# Patient Record
Sex: FEMALE | Race: WHITE | NOT HISPANIC OR LATINO | Employment: FULL TIME | URBAN - METROPOLITAN AREA
[De-identification: names, ages, dates, MRNs, and addresses within clinical notes are randomized per-mention and may not be internally consistent; named-entity substitution may affect disease eponyms.]

---

## 2017-01-02 ENCOUNTER — APPOINTMENT (OUTPATIENT)
Dept: PHYSICAL THERAPY | Facility: HOSPITAL | Age: 37
End: 2017-01-02

## 2017-01-03 ENCOUNTER — HOSPITAL ENCOUNTER (OUTPATIENT)
Dept: PHYSICAL THERAPY | Facility: HOSPITAL | Age: 37
Setting detail: THERAPIES SERIES
Discharge: HOME | End: 2017-01-03

## 2017-01-03 ENCOUNTER — TELEPHONE (OUTPATIENT)
Dept: FAMILY MEDICINE CLINIC | Facility: CLINIC | Age: 37
End: 2017-01-03

## 2017-01-03 DIAGNOSIS — R10.9 RIGHT FLANK PAIN: Primary | ICD-10-CM

## 2017-01-03 PROCEDURE — 97110 THERAPEUTIC EXERCISES: CPT | Performed by: PHYSICAL THERAPIST

## 2017-01-03 NOTE — TELEPHONE ENCOUNTER
Patient states that she has another UTI and would like something called in. Patient is allergic to Arithmin and penicillins. Last ov was 12/0. Pharmacy-[facility]

## 2017-01-03 NOTE — PROGRESS NOTES
"    Outpatient Physical Therapy Ortho Treatment Note  [facility]     Patient Name: Lita Varma  : 1980  MRN: [id number]  Today's Date: 1/3/2017      Visit Date: 2017    Visit Dx:    ICD-10-CM ICD-9-CM   1. Right flank pain R10.9 789.09       Patient Active Problem List   Diagnosis   • Dysmenorrhea   • Menorrhagia with regular cycle        Past Medical History   Diagnosis Date   • Hypertension         Past Surgical History   Procedure Laterality Date   • Appendectomy     • Tonsillectomy     • Gastric sleeve laparoscopic     • Cholecystectomy N/A 2016     Procedure: CHOLECYSTECTOMY LAPAROSCOPIC;  Surgeon: Panda Hodge MD;  Location: [facility];  Service:                              PT Assessment/Plan       17 1500 16 0900       PT Assessment    Assessment Comments Patient experienced mild symptom exacerbation prior to treatment as well as during.  She does experience a change with qped thoracic rotations, which could indicate musculoskeletal origin.  If patient continues worsen or no improvements are made then patient is likely appropriate to follow back up with her MD.  -MONI Patient tolerates exercise well this date.  She experiences no increase in her pain.  -MONI     PT Plan    PT Plan Comments try self rib mobilizations  -MONI continue progression of strengthening exercises to tolerance.  -MONI       User Key  (r) = Recorded By, (t) = Taken By, (c) = Cosigned By    Initials Name Provider Type    MONI Olivares, PT Physical Therapist     Pola Olivares, PT Physical Therapist                    Exercises       17 1500          Subjective Comments    Subjective Comments She states that she ran out of \"nerve blocking\" pills and her pain is up a little more than usual.  SHe has been working on her HEP.  -MONI      Subjective Pain    Able to rate subjective pain? yes  -MONI      Pre-Treatment Pain Level 4  -MONI      Exercise 1    Exercise Name 1 retro scit fit   L 3.5  -MONI   "    Time (Minutes) 1 5  -MONI      Exercise 2    Exercise Name 2 carrie stretch  -MONI      Sets 2 2  -MONI      Time (Seconds) 2 45  -MONI      Exercise 3    Exercise Name 3 thoracic wall slides  -MONI      Sets 3 2  -MONI      Reps 3 10  -MONI      Exercise 4    Exercise Name 4 qped thoracic rotations  -MONI      Sets 4 2  -MONI      Reps 4 10  -MONI      Exercise 5    Exercise Name 5 deep hang from door frame  -MONI      Sets 5 2  -MONI      Time (Seconds) 5 30  -MONI        User Key  (r) = Recorded By, (t) = Taken By, (c) = Cosigned By    Initials Name Provider Type    MONI Olivares, PT Physical Therapist                        Manual Rx (last 36 hours)      Manual Treatments       01/03/17 1500          Manual Rx 1    Manual Rx 1 Location thoracic spine  -MONI      Manual Rx 1 Type P-A's   -MONI      Manual Rx 1 Grade III  -MONI      Manual Rx 1 Duration 5  -MONI        User Key  (r) = Recorded By, (t) = Taken By, (c) = Cosigned By    Initials Name Provider Type    MONI Olivares, PT Physical Therapist                    Therapy Education       01/03/17 1556    Therapy Education    Given HEP   added qped thoracic rotations and thoracic wall slides  -MONI    Program New  -MONI    How Provided Verbal;Demonstration;Written  -MONI    Provided to Patient  -MONI    Level of Understanding Verbalized;Demonstrated  -MONI      User Key  (r) = Recorded By, (t) = Taken By, (c) = Cosigned By    Initials Name Provider Type    MONI Olivares, PT Physical Therapist                Time Calculation:   Start Time: 1503  Total Timed Code Minutes- PT: 45 minute(s)    Therapy Charges for Today     Code Description Service Date Service Provider Modifiers Qty    [id number]  PT THER PROC EA 15 MIN 1/3/2017 Pola Olivares, PT GP 3                    Pola Olivares, PT  1/3/2017

## 2017-01-03 NOTE — TELEPHONE ENCOUNTER
Please: Macrobid 1 twice a day ×7 days #14 with no refills and have patient follow-up with me next week in office for recheck if no better, sooner if symptoms worsen, follow up on Friday if unimproved by then.

## 2017-01-12 ENCOUNTER — APPOINTMENT (OUTPATIENT)
Dept: PHYSICAL THERAPY | Facility: HOSPITAL | Age: 37
End: 2017-01-12

## 2017-01-23 ENCOUNTER — OFFICE VISIT (OUTPATIENT)
Dept: FAMILY MEDICINE CLINIC | Facility: CLINIC | Age: 37
End: 2017-01-23

## 2017-01-23 VITALS
SYSTOLIC BLOOD PRESSURE: 120 MMHG | RESPIRATION RATE: 16 BRPM | WEIGHT: 230.6 LBS | HEIGHT: 71 IN | HEART RATE: 68 BPM | DIASTOLIC BLOOD PRESSURE: 86 MMHG | TEMPERATURE: 97.7 F | BODY MASS INDEX: 32.28 KG/M2

## 2017-01-23 DIAGNOSIS — R10.9 RIGHT FLANK PAIN, CHRONIC: Primary | ICD-10-CM

## 2017-01-23 DIAGNOSIS — G89.29 RIGHT FLANK PAIN, CHRONIC: Primary | ICD-10-CM

## 2017-01-23 PROCEDURE — 99212 OFFICE O/P EST SF 10 MIN: CPT | Performed by: PHYSICIAN ASSISTANT

## 2017-01-23 NOTE — MR AVS SNAPSHOT
Lita Varma   2017 10:15 AM   Office Visit    Dept Phone:  [phone]   Encounter #:  [id number]    Provider:  Lana Sotelo PA-C   Department:  [facility]                Your Full Care Plan              Your Updated Medication List          This list is accurate as of: 17 10:58 AM.  Always use your most recent med list.                cholecalciferol 1000 UNITS tablet   Commonly known as:  VITAMIN D3       FERROUS SULFATE PO       gabapentin 300 MG capsule   Commonly known as:  NEURONTIN   Take one nightly       ibuprofen 200 MG tablet   Commonly known as:  ADVIL,MOTRIN       MethylPREDNISolone 4 MG tablet   Commonly known as:  MEDROL (KIT)   Take as directed on package instructions.       MULTIVITAMIN ADULT PO       nitrofurantoin (macrocrystal-monohydrate) 100 MG capsule   Commonly known as:  MACROBID   Take 1 capsule by mouth 2 (Two) Times a Day.       norethindrone-ethinyl estradiol 1-20 MG-MCG per tablet   Commonly known as:  JUNEL FE    Take 1 tablet by mouth Daily. Skipping placebo pills               Instructions     None    Patient Instructions History      Upcoming Appointments     Visit Type Date Time Department    FOLLOW UP 2017 10:15 AM [facility]    GYN FOLLOW UP 2017  2:50 PM [facility]    FOLLOW UP 10/24/2017  4:30 PM [facility]      MyChart Signup     [facility] [employer] allows you to send messages to your doctor, view your test results, renew your prescriptions, schedule appointments, and more. To sign up, go to [employer] and click on the Sign Up Now link in the New User? box. Enter your [employer] Activation Code exactly as it appears below along with the last four digits of your Social Security Number and your Date of Birth () to complete the sign-up process. If you do not sign up before the expiration date, you must request a new code.    [employer]  "Activation Code: [id number]-  Expires: 1/26/2017  5:36 AM    If you have questions, you can email [email or web address] or call [phone] to talk to our MyChart staff. Remember, [email or web address] is NOT to be used for urgent needs. For medical emergencies, dial 911.               Other Info from Your Visit           Your Appointments     Jan 26, 2017  2:50 PM EST   GYN FOLLOW UP with Staci Faustin DO   [facility] [place] (--)    [address] [place] [address]  [facility] [id number] [phone]            Oct 24, 2017  4:30 PM EDT   Follow Up with DANIELLE Pascual   [facility] (--)    [address]  [facility] [address] [phone]           Arrive 15 minutes prior to appointment.              Allergies     Arithmin [Antazoline]  Rash    Penicillins  Rash      Reason for Visit     Pain pain in side, physical therapist recommended drDennise visit      Vital Signs     Blood Pressure Pulse Temperature Respirations Height Weight    120/86 (BP Location: Left arm) 68 97.7 °F (36.5 °C) (Oral) 16 71\" (180.3 cm) 230 lb 9.6 oz (105 kg)    Body Mass Index Smoking Status                32.16 kg/m2 Never Smoker            "

## 2017-01-23 NOTE — PROGRESS NOTES
Subjective   Lita Varma is a 36 y.o. female    History of Present Illness  Patient resents a for follow-up on chronic right flank pain.  This is been bothering patient on and off for over one year.  Please see previous office notes.  She states that she has recently been on physical therapy and she doesn't really think it has helped a lot.  However, it is much less painful than it was prior to her cholecystectomy at this point in time.  She states it isn't daily seems to come on at random times.  It will come on if she is been standing on her feet for a long day at work but then it worsens when she sits down.  Interestingly it always goes away 100% if she lays down.  She states it go away within a few minutes of lying down.  Never bothers her at nighttime.  It does not cause any difficulty breathing.  No  complaints at this time.  She states she can exercise without pain but it does seem to sometimes come on earlier in the day on days that she exercises.  She had an ultrasound in May 2016 showing a normal liver and normal right kidney.  She states it is tolerable at this point in time.  The following portions of the patient's history were reviewed and updated as appropriate: allergies, current medications, past social history and problem list    Review of Systems   Constitutional: Negative.    Respiratory: Negative.    Gastrointestinal: Positive for abdominal pain (right flank at right upper quadrant near rib cage when it does occur).   Musculoskeletal: Positive for back pain (soreness occurs at the right flank from the back to the front, no pain today). Negative for arthralgias, gait problem and myalgias.   Neurological: Negative for dizziness, tremors, weakness and numbness.   Psychiatric/Behavioral: Negative for behavioral problems and dysphoric mood. The patient is not nervous/anxious.        Objective     Vitals:    01/23/17 1032   BP: 120/86   Pulse: 68   Resp: 16   Temp: 97.7 °F (36.5 °C)        Physical Exam   Constitutional: She is oriented to person, place, and time. She appears well-developed and well-nourished.   Eyes: Conjunctivae are normal.   Cardiovascular: Normal rate and regular rhythm.    Pulmonary/Chest: Effort normal and breath sounds normal.   Abdominal: Soft. Bowel sounds are normal. She exhibits no distension and no mass. There is no tenderness. There is no rebound and no guarding. No hernia.   Neurological: She is alert and oriented to person, place, and time.   Skin: Skin is warm and dry. No rash noted. No erythema.   Psychiatric: She has a normal mood and affect. Her behavior is normal.   Nursing note and vitals reviewed.      Assessment/Plan     Diagnoses and all orders for this visit:    Right flank pain, chronic   reassurance given, encouraged continued core muscle strengthening, pain most consistent with musculoskeletal in nature.  Follow-up as needed.

## 2017-01-27 ENCOUNTER — DOCUMENTATION (OUTPATIENT)
Dept: PHYSICAL THERAPY | Facility: HOSPITAL | Age: 37
End: 2017-01-27

## 2017-01-27 DIAGNOSIS — R10.9 RIGHT FLANK PAIN: Primary | ICD-10-CM

## 2017-01-27 NOTE — THERAPY DISCHARGE NOTE
Outpatient Physical Therapy Discharge Summary         Patient Name: Lita Varma  : 1980  MRN: [id number]    Today's Date: 2017              PT OP Goals       17 0800          PT Short Term Goals    STG 1 deferred STG  -MONI      Long Term Goals    LTG Date to Achieve 01/10/17  -MONI      LTG 1 Patient to demonstrate independence with HEP.  -MONI      LTG 1 Progress Partially Met  -MONI      LTG 2 Patient to report pain intermittently 0/10  -MONI      LTG 2 Progress Ongoing;Not Met  -MONI      LTG 3 Patient to demonstrate minimal TTP along the abdominal regions  -MONI      LTG 3 Progress Ongoing;Not Met  -MONI      LTG 4 Patient to demonstrate right seated rotation without pain  -MONI      LTG 4 Progress Ongoing;Not Met  -MONI        User Key  (r) = Recorded By, (t) = Taken By, (c) = Cosigned By    Initials Name Provider Type    MONI Olivares, PT Physical Therapist          OP PT Discharge Summary  Reason for Discharge: Maximum functional potential achieved  Outcomes Achieved: Refer to plan of care for updates on goals achieved, Unable to make functional progress toward goals at this time  Discharge Destination: Home with home program, Unknown  Discharge Instructions: Patient was making minimal progress towards her goals.  SHe was to attend reassessment but had to cancel and was unable to reschedule, since she was making minimal progress she was about to be discharged at that time.      Attended 3/5 visits.          Pola Olivares, PT  2017

## 2017-03-01 ENCOUNTER — OFFICE VISIT (OUTPATIENT)
Dept: OBSTETRICS AND GYNECOLOGY | Facility: CLINIC | Age: 37
End: 2017-03-01

## 2017-03-01 VITALS
HEIGHT: 71 IN | BODY MASS INDEX: 31.42 KG/M2 | RESPIRATION RATE: 14 BRPM | OXYGEN SATURATION: 98 % | SYSTOLIC BLOOD PRESSURE: 104 MMHG | DIASTOLIC BLOOD PRESSURE: 70 MMHG | WEIGHT: 224.4 LBS | HEART RATE: 75 BPM

## 2017-03-01 DIAGNOSIS — N94.6 DYSMENORRHEA: ICD-10-CM

## 2017-03-01 DIAGNOSIS — N92.0 MENORRHAGIA WITH REGULAR CYCLE: ICD-10-CM

## 2017-03-01 DIAGNOSIS — Z30.41 ENCOUNTER FOR SURVEILLANCE OF CONTRACEPTIVE PILLS: ICD-10-CM

## 2017-03-01 DIAGNOSIS — Z01.419 WELL WOMAN EXAM WITH ROUTINE GYNECOLOGICAL EXAM: Primary | ICD-10-CM

## 2017-03-01 PROCEDURE — 99395 PREV VISIT EST AGE 18-39: CPT | Performed by: NURSE PRACTITIONER

## 2017-03-01 RX ORDER — LEVONORGESTREL / ETHINYL ESTRADIOL AND ETHINYL ESTRADIOL 150-30(84)
1 KIT ORAL DAILY
Qty: 91 EACH | Refills: 1 | Status: SHIPPED | OUTPATIENT
Start: 2017-03-01 | End: 2017-04-12

## 2017-03-01 NOTE — PROGRESS NOTES
[facility] ANNUAL WELL WOMAN VISIT      Lita Varma  [id number]  1980      Chief Complaint: Gynecologic Exam (problems with OCPs)        History of present illness:    Lita Varma is a 36 y.o. year old  presenting to be seen for her annual exam. She is a previous patient of Dr. Masha Milton, last seen for a problem visit on 2016. At that time she was started on OCPs for dysmenorrhea and menorrhagia. She was instructed to take these continuously, but has changed how she has taken them every month since that time and is not having positive results. She reports UTI the month she took the placebo week and then spotting between periods when skipping placebos. She is requesting an alternate method, but does state she is happy with pill form.  She states she is also in need of an annual exam and pap smear upon arrival today. Of note, she is s/p gastric sleeve 1.5 years ago and has lost 160 lbs in that time. She completed a triathalon recently and is training for a /2 marathon this spring.    SEXUAL Hx:  She is currently sexually active.  In the past year there has not been new sexual partners.    Condoms are typically used.  She would not like to be screened for STD's at today's exam.  Current birth control method: OCP (estrogen/progesterone).  She is not happy with her current method of contraception and does want to discuss alternative methods of contraception.  MENSTRUAL Hx:  Patient's last menstrual period was 2017.  In the past 6 months her cycles have been irregular.   Her menstrual flow is lighter since starting OCPs.   Each month on average there are roughly 0 days of very heavy flow.    Intermenstrual bleeding is present.    Post-coital bleeding is absent.  Dysmenorrhea: is not affecting her activities of daily living  PMS: is not affecting her activities of daily living  Her cycles are a source of concern for her that she wishes to discuss today.  HEALTH Hx:  She  exercises regularly: yes.  She wears her seat belt:yes.  She has concerns about domestic violence: no.  She is a non-smoker.      Past Medical History   Diagnosis Date   • Hypertension        Past Surgical History   Procedure Laterality Date   • Appendectomy     • Tonsillectomy     • Gastric sleeve laparoscopic     • Cholecystectomy N/A 11/11/2016     Procedure: CHOLECYSTECTOMY LAPAROSCOPIC;  Surgeon: Panda Hodge MD;  Location: [facility];  Service:        MEDICATIONS: The current medication list was reviewed and reconciled.     Allergies:  is allergic to arithmin [antazoline] and penicillins.    Family History   Problem Relation Age of Onset   • Cancer Father    • Heart disease Father    • Breast cancer Paternal Aunt    • Heart disease Paternal Grandmother    • Stroke Paternal Grandmother    • Stroke Maternal Grandmother        Health Maintenance:  Last pap smear was 2 years ago, results were  normal PAP..    Review of Systems   Constitutional: Negative for fatigue, fever and unexpected weight change.   HENT: Negative for congestion, ear pain, hearing loss, sinus pressure and trouble swallowing.    Eyes: Negative for visual disturbance.   Respiratory: Negative for cough, chest tightness, shortness of breath and wheezing.    Cardiovascular: Negative for chest pain, palpitations and leg swelling.   Gastrointestinal: Negative for abdominal distention, abdominal pain, constipation, diarrhea, nausea and vomiting.   Endocrine: Negative for cold intolerance, heat intolerance, polydipsia, polyphagia and polyuria.   Genitourinary: Positive for menstrual problem. Negative for difficulty urinating, dyspareunia, dysuria, frequency, hematuria, pelvic pain, urgency, vaginal bleeding, vaginal discharge and vaginal pain.   Musculoskeletal: Negative for arthralgias, gait problem, joint swelling and myalgias.   Skin: Negative for color change, pallor and rash.   Neurological: Negative for dizziness, seizures, syncope,  "weakness, light-headedness, numbness and headaches.   Hematological: Negative for adenopathy. Does not bruise/bleed easily.   Psychiatric/Behavioral: Negative for agitation, confusion, sleep disturbance and suicidal ideas. The patient is not nervous/anxious.        Physical Exam  Vital Signs: Visit Vitals   • /70   • Pulse 75   • Resp 14   • Ht 71\" (180.3 cm)   • Wt 224 lb 6.4 oz (102 kg)   • LMP 02/19/2017   • SpO2 98%   • BMI 31.3 kg/m2      General Appearance:  alert, cooperative, no apparent distress and appears stated age   Neurologic/Psychiatric: A&O x 3, gait steady, appropriate affect   HEENT:  Normocephalic, without obvious abnormality, mucous membranes moist   Neck: Supple, symmetrical, trachea midline, no adenopathy;  No thyromegaly, masses, or tenderness   Back:   Symmetric, no curvature, ROM normal, no CVA tenderness   Lungs:   Clear to auscultation bilaterally; respirations regular, even, and unlabored bilaterally   Heart:  Regular rate and rhythm, no murmurs appreciated   Breasts:  Symmetrical, no masses, no lesions and no nipple discharge   Abdomen:   Soft, non-tender, non-distended, no organomegaly and excess abdominal skin apparent consistent with considerable weight loss   Lymph nodes: No cervical, supraclavicular, inguinal or axillary adenopathy noted   Extremities: Normal, atraumatic; no clubbing, cyanosis, or edema    Skin: No rashes, ulcers, or suspicious lesions noted   Pelvic: External Genitalia  without lesions or skin changes  Vagina  is pink, moist, without lesions.   Cervix  normal, without lesions, no discharge, no CMT and pap obtained  Uterus  normal size, midposition, mobile and nontender  Ovaries  without palpable masses or fullness  Parametria  smooth  Rectovaginal  Female rectovaginal: deferred       Procedure Note:  No notes on file    Assessment and Plan:    Lita was seen today for gynecologic exam.    Diagnoses and all orders for this visit:    Well woman exam with " routine gynecological exam    Menorrhagia with regular cycle    Dysmenorrhea    Encounter for surveillance of contraceptive pills    Other orders  -     Levonorgest-Eth Estrad 91-Day 0.15-0.03 &0.01 MG tablet; Take 1 tablet by mouth Daily.        Lita and I discussed her abnormal menses since starting OCPs. Her dysmenorrhea has improved and she would like to continue some type of hormonal contraceptive. She is understanding that not having taken her pills consistently over the last 3 months may be contributing to her new irregularity and breakthrough bleeding. We discussed a number of alternative options such as injections, implants, IUDs, and other OCPs. At this time she would like to continue OCPs, but decision made to switch her to Seasonique. This way she may have continuous OCPs with a withdrawal bleed every 3 months. She was encouraged to take this daily without missing pills or changing methods for 3-6 months to get a proper idea of how she will tolerate this. She may have BTB until her body adjusts to the new medication. Encouraged backup barrier contraception for the next 4 weeks at least. If this method is not effective, she states she may consider IUD in the future.    Even though it has been less then 3 years since her last Pap smear, a Pap was done today per patient request.  If she does not receive the results of the Pap within 2 weeks  time, she was instructed to call to find out the results.  I explained to Lita that the recommendations for Pap smear interval in a low risk patient's has lengthened to 3 years time.  I encouraged her to be seen yearly for a full physical exam including breast and pelvic exam even during the off years when PAP's will not be performed.    She was recommended to begin mammograms at age 40 for breast cancer screening, colonoscopy at age 50 for colon cancer screening and bone density scans (DEXA) at age 60-65 for osteoporosis screening.       Return in about 1 year  (around 3/1/2018) for annual exam. Call if ongoing menstrual problems.       DILEEP [place]      Note: Speech recognition transcription software was used to dictate portions of this document.  An attempt at proofreading has been made though minor errors in transcription may still be present.  Please do not hesitate to call our office with any questions.

## 2017-03-03 ENCOUNTER — TELEPHONE (OUTPATIENT)
Dept: OBSTETRICS AND GYNECOLOGY | Facility: CLINIC | Age: 37
End: 2017-03-03

## 2017-03-09 ENCOUNTER — TELEPHONE (OUTPATIENT)
Dept: OBSTETRICS AND GYNECOLOGY | Facility: CLINIC | Age: 37
End: 2017-03-09

## 2017-04-12 ENCOUNTER — OFFICE VISIT (OUTPATIENT)
Dept: FAMILY MEDICINE CLINIC | Facility: CLINIC | Age: 37
End: 2017-04-12

## 2017-04-12 VITALS
OXYGEN SATURATION: 99 % | SYSTOLIC BLOOD PRESSURE: 104 MMHG | WEIGHT: 227 LBS | HEIGHT: 71 IN | BODY MASS INDEX: 31.78 KG/M2 | DIASTOLIC BLOOD PRESSURE: 68 MMHG | TEMPERATURE: 97.9 F | HEART RATE: 78 BPM

## 2017-04-12 DIAGNOSIS — J06.9 URI, ACUTE: Primary | ICD-10-CM

## 2017-04-12 PROCEDURE — 99213 OFFICE O/P EST LOW 20 MIN: CPT | Performed by: PHYSICIAN ASSISTANT

## 2017-04-12 PROCEDURE — 96372 THER/PROPH/DIAG INJ SC/IM: CPT | Performed by: PHYSICIAN ASSISTANT

## 2017-04-12 RX ORDER — TRIAMCINOLONE ACETONIDE 40 MG/ML
40 INJECTION, SUSPENSION INTRA-ARTICULAR; INTRAMUSCULAR ONCE
Status: COMPLETED | OUTPATIENT
Start: 2017-04-12 | End: 2017-04-12

## 2017-04-12 RX ORDER — AZITHROMYCIN 250 MG/1
TABLET, FILM COATED ORAL
Qty: 6 TABLET | Refills: 0 | Status: SHIPPED | OUTPATIENT
Start: 2017-04-12 | End: 2017-10-24

## 2017-04-12 RX ADMIN — TRIAMCINOLONE ACETONIDE 40 MG: 40 INJECTION, SUSPENSION INTRA-ARTICULAR; INTRAMUSCULAR at 13:38

## 2017-04-12 NOTE — PROGRESS NOTES
Subjective   Lita Varma is a 36 y.o. female    History of Present Illness    Patient presents today for evaluation of new onset symptoms of head and chest congestion.  Patient states symptoms started on Friday and have worsened.  She is leaving town to go to [place] in 5 days.  She has a lot of fullness in her ears, jaws popping and cracking with history of TMJ and cough.  No fever.  The following portions of the patient's history were reviewed and updated as appropriate: allergies, current medications, past social history and problem list    Review of Systems   Constitutional: Negative for chills, fatigue and fever.   HENT: Positive for congestion, ear pain, postnasal drip, rhinorrhea and sinus pressure. Negative for sore throat.    Eyes: Positive for pain.   Respiratory: Positive for cough. Negative for shortness of breath.    Neurological: Positive for headaches. Negative for dizziness.   Hematological: Negative for adenopathy.       Objective     Vitals:    04/12/17 1315   BP: 104/68   Pulse: 78   Temp: 97.9 °F (36.6 °C)   SpO2: 99%       Physical Exam   Constitutional: She appears well-developed and well-nourished. No distress.   HENT:   Head: Normocephalic and atraumatic.   Right Ear: External ear normal.   Left Ear: External ear normal.   Nose: Nose normal.   Mouth/Throat: Oropharynx is clear and moist. No oropharyngeal exudate.   Eyes: Conjunctivae are normal. Right eye exhibits no discharge. Left eye exhibits no discharge.   Neck: Normal range of motion. Neck supple.   Cardiovascular: Normal rate, regular rhythm and normal heart sounds.    Pulmonary/Chest: Effort normal and breath sounds normal. No respiratory distress.   Lymphadenopathy:     She has no cervical adenopathy.   Skin: Skin is warm and dry. She is not diaphoretic.   Nursing note and vitals reviewed.      Assessment/Plan     Diagnoses and all orders for this visit:    URI, acute  -     azithromycin (ZITHROMAX Z-KIT) 250 MG tablet; Take 2  tablets the first day, then 1 tablet daily for 4 days.  -     Chlorcyclizine-Pseudoephed 25-60 MG tablet; Take 25 mg by mouth 2 (Two) Times a Day. Start with 1/2 tablet every 12 hours for congestion  -     triamcinolone acetonide (KENALOG-40) injection 40 mg; Inject 1 mL into the shoulder, thigh, or buttocks 1 (One) Time.

## 2017-10-24 ENCOUNTER — OFFICE VISIT (OUTPATIENT)
Dept: BARIATRICS/WEIGHT MGMT | Facility: CLINIC | Age: 37
End: 2017-10-24

## 2017-10-24 VITALS
BODY MASS INDEX: 30.94 KG/M2 | SYSTOLIC BLOOD PRESSURE: 113 MMHG | HEART RATE: 85 BPM | OXYGEN SATURATION: 99 % | HEIGHT: 71 IN | WEIGHT: 221 LBS | TEMPERATURE: 98.5 F | RESPIRATION RATE: 18 BRPM | DIASTOLIC BLOOD PRESSURE: 76 MMHG

## 2017-10-24 DIAGNOSIS — R53.83 FATIGUE, UNSPECIFIED TYPE: Primary | ICD-10-CM

## 2017-10-24 DIAGNOSIS — R10.11 RUQ ABDOMINAL PAIN: ICD-10-CM

## 2017-10-24 DIAGNOSIS — Z98.84 S/P BARIATRIC SURGERY: ICD-10-CM

## 2017-10-24 DIAGNOSIS — E61.1 IRON DEFICIENCY: ICD-10-CM

## 2017-10-24 DIAGNOSIS — E55.9 VITAMIN D DEFICIENCY: ICD-10-CM

## 2017-10-24 DIAGNOSIS — R10.13 DYSPEPSIA: ICD-10-CM

## 2017-10-24 PROCEDURE — 99214 OFFICE O/P EST MOD 30 MIN: CPT | Performed by: PHYSICIAN ASSISTANT

## 2017-10-24 RX ORDER — ASCORBIC ACID 500 MG
500 TABLET ORAL DAILY
COMMUNITY

## 2017-10-24 NOTE — PROGRESS NOTES
[facility]  [address] [place] [address]  formerly [facility] [id number] [phone]        Patient Name:  Lita Varma.  :  1980      Date of Visit: 10/25/2017      Reason for Visit:   Annual Eval      HPI: Lita Varma is a 37 y.o. female s/p LSG by GDW on 10/29/15, followed by lap paul on 16 by GDW.    Still unfortunately has chronic episodic RUQ pain.  These complaints of pain precipitated her GB eval in 2016 and ultimately led to lap paul as above.  Patient says unfortunately she feels having her gallbladder removed only made the pain worse.  It comes and goes.  Can never predict when she will have the pain.  Seems to be worse w/ sitting for extended periods of time or travel.  Gets better when her weight remains fairly constant for several days, but will worsen if her weight fluctuates.  Says her PCP told her it must just be pain related to movement of her internal organs that is more noticeable since losing weight.  She has also been told that she should work on strengthening her core abdominal muscles to see if that helps, but she admits, she really has not been committed to that type of exercise at this point.     Feels good otherwise.  Would like to pursue plastic surgery but says she does not have the money for that at this point.  No other issues/concerns.  Continues on a MVI and iron supplement.  Exercises 4 days/week.     Presurgery weight: 389 pounds.  Today's weight is 221 lb (100 kg) pounds, today's  Body mass index is 30.82 kg/(m^2)., and her weight loss since surgery is 168 pounds.      Past Medical History:   Diagnosis Date   • Allergic rhinitis    • Anxiety    • Asthma    • Chronic RUQ pain    • Depression    • Dyspnea on exertion    • Fatigue    • Heart burn    • Hyperlipidemia    • Hypertension    • Iron deficiency anemia     on PO iron   • Joint pain    • Migraine     previously took Amitriptyline   • Obesity    • Restless legs syndrome   "   worse when iron levels low     Past Surgical History:   Procedure Laterality Date   • APPENDECTOMY  1989   • CHOLECYSTECTOMY N/A 11/11/2016    Procedure: CHOLECYSTECTOMY LAPAROSCOPIC;  Surgeon: Panda Hodge MD;  Location: [facility];  Service:    • GASTRIC SLEEVE LAPAROSCOPIC  10/29/2015    GDW   • TONSILLECTOMY  1998     Outpatient Prescriptions Marked as Taking for the 10/24/17 encounter (Office Visit) with DANIELLE Pascual   Medication Sig Dispense Refill   • FERROUS SULFATE PO Take  by mouth.     • Multiple Vitamins-Minerals (MULTIVITAMIN ADULT PO) Take  by mouth.     • vitamin C (ASCORBIC ACID) 500 MG tablet Take 500 mg by mouth Daily.         Allergies   Allergen Reactions   • Arithmin [Antazoline] Rash   • Penicillins Rash       Social History     Social History   • Marital status: Single     Spouse name: N/A   • Number of children: N/A   • Years of education: N/A     Occupational History   • Not on file.     Social History Main Topics   • Smoking status: Never Smoker   • Smokeless tobacco: Never Used   • Alcohol use Yes      Comment: ocassionally   • Drug use: No   • Sexual activity: Not Currently     Other Topics Concern   • Not on file     Social History Narrative       /76 (BP Location: Left arm, Patient Position: Sitting, Cuff Size: Large Adult)  Pulse 85  Temp 98.5 °F (36.9 °C) (Temporal Artery )   Resp 18  Ht 71\" (180.3 cm)  Wt 221 lb (100 kg)  SpO2 99%  BMI 30.82 kg/m2    Physical Exam   Constitutional: She appears well-developed and well-nourished. She is cooperative.   HENT:   Mouth/Throat: Oropharynx is clear and moist and mucous membranes are normal.   Eyes: Conjunctivae are normal. No scleral icterus.   Cardiovascular: Normal rate.    Pulmonary/Chest: Effort normal.   Abdominal: Soft. There is no tenderness.   Musculoskeletal: Normal range of motion. She exhibits no edema.   Neurological: She is alert.   Skin: Skin is warm and dry. No rash noted.   Psychiatric: She has " a normal mood and affect. Judgment normal.         Assessment:  2 years s/p LSG by GDW on 10/29/15, followed by mello miller on 11/11/16 by GDW.    ICD-10-CM ICD-9-CM   1. Fatigue, unspecified type R53.83 780.79   2. Dyspepsia R10.13 536.8   3. RUQ abdominal pain R10.11 789.01   4. Vitamin D deficiency E55.9 268.9   5. Iron deficiency E61.1 280.9   6. S/P bariatric surgery Z98.84 V45.86       Plan:  Continue w/ good food choices and healthy habits.  Continue to focus on high protein, low carb.  Continue routine exercise and advised core strengthening exercises as previously recommended.  Routine bariatric labs ordered.  Continue vitamins w/ adjustments pending lab results.  Call w/ problems/concerns.     The patient was instructed to follow up in 6 months, sooner if needed.      Total time spent w/ patient 25 minutes and 15 minutes spent counseling the patient on nutrition and necessary dietary/lifestyle modifications.

## 2017-11-17 ENCOUNTER — TELEPHONE (OUTPATIENT)
Dept: OBSTETRICS AND GYNECOLOGY | Facility: CLINIC | Age: 37
End: 2017-11-17

## 2017-11-17 NOTE — TELEPHONE ENCOUNTER
Lita has not been taking birth control pills.  When she saw Malena several months ago, they discussed Seasonale, but the patient states that it is too expensive ($100).  She said that at the time she was having severe dysmenorrhea, but that seems to have gotten better.  At this point, she is in a relationship and simply wants contraception.  She is fine with a 28 day pill.  She is expecting to start a period on Monday 11/20/17. I explained to her that Malena is out of the office this afternoon and told her that if she doesn't hear from our office on Monday to call back.

## 2017-11-17 NOTE — TELEPHONE ENCOUNTER
Patient was prescribed birth control however called back last time was way to expensive and now needs birth control can we call her in a generic similar to that might be cheaper if not that's ok as well a

## 2017-11-20 RX ORDER — NORGESTIMATE AND ETHINYL ESTRADIOL 0.25-0.035
1 KIT ORAL DAILY
Qty: 28 TABLET | Refills: 12 | Status: SHIPPED | OUTPATIENT
Start: 2017-11-20 | End: 2018-02-25 | Stop reason: SDUPTHER

## 2017-11-20 NOTE — TELEPHONE ENCOUNTER
I return patient's call to discuss contraception.  She is in any relationship and desiring of contraception.  She did not do well on Loestrin 1/20 in the past but would like to continue with the pill form.  Earlier in the year we discussed Seasonique, but this was too expensive.  After discussion we decided upon Sprintec.  She started her menses 2 days ago.  She was instructed to begin her new pills this coming weekend.  She was encouraged to use backup contraception for at least the first cycle and to take her pills at the same time daily.  Medication risks, benefits, instructions, and potential side effects reviewed.  ACHES reviewed.

## 2017-12-07 ENCOUNTER — TELEPHONE (OUTPATIENT)
Dept: FAMILY MEDICINE CLINIC | Facility: CLINIC | Age: 37
End: 2017-12-07

## 2017-12-07 RX ORDER — FLUCONAZOLE 150 MG/1
150 TABLET ORAL ONCE
Qty: 1 TABLET | Refills: 1 | Status: SHIPPED | OUTPATIENT
Start: 2017-12-07 | End: 2017-12-07

## 2017-12-07 NOTE — TELEPHONE ENCOUNTER
----- Message from Maye Orona sent at 12/6/2017 12:44 PM EST -----  Patient said that she would like to have something called in for a yeast infection to   [facility] [place], [place] - [address] AT [facility] [address] - [phone]  - [phone]   Thanks,  Maye..

## 2018-02-25 ENCOUNTER — TELEPHONE (OUTPATIENT)
Dept: OBSTETRICS AND GYNECOLOGY | Facility: CLINIC | Age: 38
End: 2018-02-25

## 2018-02-25 RX ORDER — NORGESTIMATE AND ETHINYL ESTRADIOL 0.25-0.035
1 KIT ORAL DAILY
Qty: 28 TABLET | Refills: 1 | Status: SHIPPED | OUTPATIENT
Start: 2018-02-25 | End: 2018-07-09 | Stop reason: SDUPTHER

## 2018-02-25 NOTE — TELEPHONE ENCOUNTER
Received call through [facility].    She was anticipating arrival of her birth control through [facility].  It has not arrived and she said to start tomorrow.  She is certain [facility] has the prescription ongoing.  I have sent 1 pack with 1 refill to [facility].    New Medications Ordered This Visit   Medications   • norgestimate-ethinyl estradiol (ORTHO-CYCLEN) 0.25-35 MG-MCG per tablet     Sig: Take 1 tablet by mouth Daily.     Dispense:  28 tablet     Refill:  1

## 2018-07-09 DIAGNOSIS — N92.1 MENORRHAGIA WITH IRREGULAR CYCLE: Primary | ICD-10-CM

## 2018-07-09 RX ORDER — NORGESTIMATE AND ETHINYL ESTRADIOL 0.25-0.035
1 KIT ORAL DAILY
Qty: 28 TABLET | Refills: 1 | Status: SHIPPED | OUTPATIENT
Start: 2018-07-09 | End: 2018-10-01 | Stop reason: SDUPTHER

## 2018-07-09 RX ORDER — NORGESTIMATE AND ETHINYL ESTRADIOL 0.25-0.035
1 KIT ORAL DAILY
Qty: 112 TABLET | OUTPATIENT
Start: 2018-07-09

## 2018-09-13 ENCOUNTER — TELEPHONE (OUTPATIENT)
Dept: OBSTETRICS AND GYNECOLOGY | Facility: CLINIC | Age: 38
End: 2018-09-13

## 2018-09-13 NOTE — TELEPHONE ENCOUNTER
[phone] spoke with patient and scheduled her with Dr. Pinto on Monday 10/1/2018 @ 2:40pm for her annual. Patient states she does not need a refill on her birth control at this time she has enough to last her until her appointment.

## 2018-09-13 NOTE — TELEPHONE ENCOUNTER
----- Message from Marisol Brady LPN sent at 9/13/2018  9:52 AM EDT -----  Regarding: FW: ELISABET - RX REFILL  Contact: [phone]      ----- Message -----  From: Shiloh Martini  Sent: 9/13/2018   9:44 AM  To: [facility]  Subject: ELISABET - RX REFILL                              PATIENT CALLED BECAUSE SHE IS OUT OF HER BIRTH CONTROL.  SHE DOESN'T KNOW IF WE CAN JUST REFILL OR IF SHE HAS TO COME OUT.  SHE IS NOW USING A MAIL ORDER PHARMACY WHICH IS [facility] THEIR FAX IS [phone].

## 2018-09-13 NOTE — TELEPHONE ENCOUNTER
Previous patient of Malena Roman requesting a refill on her birth control. Patient is past due for her annual, last appointment was on 3/1/2017. There is no upcoming appointment scheduled. Patient needs to schedule an appointment prior to additional refills.  [phone] left message for patient to return my call.

## 2018-09-18 DIAGNOSIS — N92.1 MENORRHAGIA WITH IRREGULAR CYCLE: ICD-10-CM

## 2018-09-19 RX ORDER — NORGESTIMATE AND ETHINYL ESTRADIOL 0.25-0.035
1 KIT ORAL DAILY
Qty: 28 TABLET | OUTPATIENT
Start: 2018-09-19

## 2018-09-30 PROBLEM — Z01.419 WELL WOMAN EXAM: Status: ACTIVE | Noted: 2018-09-30

## 2018-10-01 ENCOUNTER — OFFICE VISIT (OUTPATIENT)
Dept: OBSTETRICS AND GYNECOLOGY | Facility: CLINIC | Age: 38
End: 2018-10-01

## 2018-10-01 ENCOUNTER — LAB (OUTPATIENT)
Dept: LAB | Facility: HOSPITAL | Age: 38
End: 2018-10-01

## 2018-10-01 VITALS — DIASTOLIC BLOOD PRESSURE: 64 MMHG | SYSTOLIC BLOOD PRESSURE: 110 MMHG

## 2018-10-01 DIAGNOSIS — Z11.3 ROUTINE SCREENING FOR STI (SEXUALLY TRANSMITTED INFECTION): ICD-10-CM

## 2018-10-01 DIAGNOSIS — Z01.419 WELL WOMAN EXAM: Primary | ICD-10-CM

## 2018-10-01 DIAGNOSIS — N92.1 MENORRHAGIA WITH IRREGULAR CYCLE: ICD-10-CM

## 2018-10-01 LAB
HBV SURFACE AG SERPL QL IA: NORMAL
HCV AB SER DONR QL: NORMAL
HIV1+2 AB SER QL: NORMAL

## 2018-10-01 PROCEDURE — 87340 HEPATITIS B SURFACE AG IA: CPT

## 2018-10-01 PROCEDURE — 86803 HEPATITIS C AB TEST: CPT

## 2018-10-01 PROCEDURE — G0432 EIA HIV-1/HIV-2 SCREEN: HCPCS

## 2018-10-01 PROCEDURE — 86592 SYPHILIS TEST NON-TREP QUAL: CPT | Performed by: OBSTETRICS & GYNECOLOGY

## 2018-10-01 PROCEDURE — 99395 PREV VISIT EST AGE 18-39: CPT | Performed by: OBSTETRICS & GYNECOLOGY

## 2018-10-01 PROCEDURE — 36415 COLL VENOUS BLD VENIPUNCTURE: CPT | Performed by: OBSTETRICS & GYNECOLOGY

## 2018-10-01 RX ORDER — MELATONIN
3000 DAILY
COMMUNITY

## 2018-10-01 RX ORDER — NORGESTIMATE AND ETHINYL ESTRADIOL 0.25-0.035
1 KIT ORAL DAILY
Qty: 28 TABLET | Refills: 12 | Status: SHIPPED | OUTPATIENT
Start: 2018-10-01 | End: 2018-10-22

## 2018-10-01 NOTE — PROGRESS NOTES
Chief Complaint   Patient presents with   • Gynecologic Exam     Transfer GYN from edmundo Guy c/o's       Lita Varma is a 37 y.o. year old  presenting to be seen for her annual exam.     SEXUAL Hx:  She is currently sexually active.  In the past year there there has been MORE THAN ONE new sexual partner.    Condoms mostly.  She would like to be screened for STD's at today's exam.  Current birth control method: OCP (estrogen/progesterone).  She is sort happy with her current method of contraception and does want to discuss alternative methods of contraception. Concerned about weight gain.  Has been on birth control pill for about year - does not miss pills.   MENSTRUAL Hx:  Patient's last menstrual period was 2018 (approximate).  In the past 6 months her cycles have been regular, predictable and occur monthly.  Her menstrual flow is typically light.   Each month on average there are roughly 0 day(s) of very heavy flow. Duration 5 days.   Intermenstrual bleeding is absent.    Post-coital bleeding is absent.  Dysmenorrhea: mild and is not affecting her activities of daily living  PMS: mild and is not affecting her activities of daily living  Her cycles are not a source of concern for her that she wishes to discuss today.  HEALTH Hx:  She exercises regularly: yes.  She wears her seat belt: mostly .  She has concerns about domestic violence: no.  OTHER THINGS SHE WANTS TO DISCUSS TODAY:  Nothing else    The following portions of the patient's history were reviewed and updated as appropriate:problem list, current medications, allergies, past family history, past medical history, past social history and past surgical history.    Smoking status: Never Smoker                                                              Smokeless tobacco: Never Used                        Review of Systems  Constitutional POS: nothing reported    NEG: anorexia or night sweats   Genitourinary POS: nothing reported     NEG: dysuria or hematuria      Gastointestinal POS: nothing reported    NEG: bloating, change in bowel habits, melena or reflux symptoms   Integument POS: nothing reported    NEG: moles that are changing in size, shape, color or rashes   Breast POS: nothing reported    NEG: persistent breast lump, skin dimpling or nipple discharge        Objective   /64 (Patient Position: Sitting)   LMP 09/09/2018 (Approximate)     General:  well developed; well nourished  no acute distress   Skin:  No suspicious lesions seen   Thyroid: normal to inspection and palpation   Breasts:  Examined in supine position  Symmetric without masses or skin dimpling  Nipples normal without inversion, lesions or discharge  There are no palpable axillary nodes   Abdomen: soft, non-tender; no masses  no umbilical or inginual hernias are present  no hepato-splenomegaly   Pelvis: Clinical staff was present for exam  External genitalia:  normal appearance of the external genitalia including Bartholin's and McDade's glands.  :  urethral meatus normal;  Vaginal:  normal pink mucosa without prolapse or lesions.  Cervix:  normal appearance.  Uterus:  normal size, shape and consistency.  Adnexa:  normal bimanual exam of the adnexa.        Assessment   1. Normal GYN exam  2. Contraceptive counseling   3. STI screening     Plan   1. Pap and STD testing was done today.  If she does not receive the results of the Pap within 2 weeks  time, she was instructed to call to find out the results.  I explained to Lita that the recommendations for Pap smear interval in a low risk patient's has lengthened to 3 years time.  I encouraged her to be seen yearly for a full physical exam including breast and pelvic exam even during the off years when PAP's will not be performed.  2. Reviewed other options for contraception using a tiered approach and she potentially interested in nexplanon.   3. Prescription(s) for OCP's were refilled today   4. The importance of  keeping all planned follow-up and taking all medications as prescribed was emphasized.  5. Follow up for annual exam in 1 year or sooner if she decides to have nexplanon or LNG IUD    New Medications Ordered This Visit   Medications   • norgestimate-ethinyl estradiol (ORTHO-CYCLEN) 0.25-35 MG-MCG per tablet     Sig: Take 1 tablet by mouth Daily.     Dispense:  28 tablet     Refill:  12          This note was electronically signed.    Kamila Pinto MD  October 1, 2018    Note: Speech recognition transcription software may have been used to create portions of this document.  An attempt at proofreading has been made but errors in transcription could still be present.

## 2018-10-03 ENCOUNTER — TELEPHONE (OUTPATIENT)
Dept: OBSTETRICS AND GYNECOLOGY | Facility: CLINIC | Age: 38
End: 2018-10-03

## 2018-10-03 LAB — RPR SER QL: NORMAL

## 2018-10-03 NOTE — TELEPHONE ENCOUNTER
Called patient to review negative STD cultures and blood work. No answer. Left VM. Pap still pending. Can you try to call once more to let her know? Thanks    Kamila Pinto MD

## 2018-10-04 PROBLEM — B97.7 HPV IN FEMALE: Status: ACTIVE | Noted: 2018-10-04

## 2018-10-05 ENCOUNTER — TELEPHONE (OUTPATIENT)
Dept: OBSTETRICS AND GYNECOLOGY | Facility: CLINIC | Age: 38
End: 2018-10-05

## 2018-10-05 NOTE — TELEPHONE ENCOUNTER
Called patient to review normal STD testing and pap with normal cytology but +[id number]. No answer but left VM with these results and recommendation for colposcopy. Will attempt to call back patient this afternoon and then on Monday.     Kamila Pinot MD

## 2018-10-08 NOTE — TELEPHONE ENCOUNTER
Spoke with pt concerning testing results, she verbalizes understanding. Scheduled for colposcopy and IUD placement.

## 2018-10-22 ENCOUNTER — OFFICE VISIT (OUTPATIENT)
Dept: FAMILY MEDICINE CLINIC | Facility: CLINIC | Age: 38
End: 2018-10-22

## 2018-10-22 VITALS
DIASTOLIC BLOOD PRESSURE: 68 MMHG | HEART RATE: 75 BPM | OXYGEN SATURATION: 98 % | SYSTOLIC BLOOD PRESSURE: 118 MMHG | HEIGHT: 71 IN | TEMPERATURE: 97.9 F

## 2018-10-22 DIAGNOSIS — L30.9 DERMATITIS: ICD-10-CM

## 2018-10-22 DIAGNOSIS — J32.9 SINUSITIS, UNSPECIFIED CHRONICITY, UNSPECIFIED LOCATION: Primary | ICD-10-CM

## 2018-10-22 PROCEDURE — 99213 OFFICE O/P EST LOW 20 MIN: CPT | Performed by: PHYSICIAN ASSISTANT

## 2018-10-22 RX ORDER — AZITHROMYCIN 250 MG/1
TABLET, FILM COATED ORAL
Qty: 6 TABLET | Refills: 0 | Status: SHIPPED | OUTPATIENT
Start: 2018-10-22 | End: 2018-11-06

## 2018-10-22 RX ORDER — TRIAMCINOLONE ACETONIDE 0.25 MG/G
OINTMENT TOPICAL 3 TIMES DAILY
Qty: 15 G | Refills: 0 | Status: SHIPPED | OUTPATIENT
Start: 2018-10-22 | End: 2019-04-11

## 2018-10-22 NOTE — PROGRESS NOTES
Subjective   Lita Varma is a 38 y.o. female  Earache (bilateral ear pain x4 days ); sinus pressure (sinus pressure with headache x4 days ); and Sore Throat (sore throat x1 week )      History of Present Illness  Patient comes in today complaining of a severe cough with headache sinus pressure postnasal drainage sore throat and fatigue for the past week with symptoms worsening daily despite using over-the-counter medications.  She also has had severe problems with dry cracked lips.  The following portions of the patient's history were reviewed and updated as appropriate: allergies, current medications, past social history and problem list    Review of Systems   Constitutional: Negative for chills, fatigue and fever.   HENT: Positive for congestion, ear pain, postnasal drip, rhinorrhea and sinus pressure. Negative for sore throat.    Eyes: Positive for pain.   Respiratory: Positive for cough. Negative for shortness of breath.    Skin: Positive for color change and wound.   Neurological: Positive for headaches. Negative for dizziness.   Hematological: Negative for adenopathy.       Objective     Vitals:    10/22/18 1611   BP: 118/68   Pulse: 75   Temp: 97.9 °F (36.6 °C)   SpO2: 98%       Physical Exam   Constitutional: She appears well-developed and well-nourished.   HENT:   Head: Normocephalic and atraumatic.   Right Ear: Tympanic membrane and ear canal normal.   Left Ear: Tympanic membrane and ear canal normal.   Nose: Mucosal edema, rhinorrhea and sinus tenderness present. Right sinus exhibits maxillary sinus tenderness and frontal sinus tenderness. Left sinus exhibits maxillary sinus tenderness and frontal sinus tenderness.   Mouth/Throat: Oropharynx is clear and moist. No oropharyngeal exudate.   Eyes: Pupils are equal, round, and reactive to light.   Cardiovascular: Normal rate, regular rhythm and normal heart sounds.    Pulmonary/Chest: Effort normal. She has wheezes.   Skin: Skin is warm and dry.   Dry  cracking with scaliness on lower mid lip externally   Nursing note and vitals reviewed.      Assessment/Plan     Diagnoses and all orders for this visit:    Sinusitis, unspecified chronicity, unspecified location    Dermatitis    Other orders  -     triamcinolone (KENALOG) 0.025 % ointment; Apply  topically to the appropriate area as directed 3 (Three) Times a Day. TO FACIAL RASH  -     azithromycin (ZITHROMAX Z-KIT) 250 MG tablet; Take 2 tablets the first day, then 1 tablet daily for 4 days.

## 2018-11-06 ENCOUNTER — OFFICE VISIT (OUTPATIENT)
Dept: OBSTETRICS AND GYNECOLOGY | Facility: CLINIC | Age: 38
End: 2018-11-06

## 2018-11-06 VITALS — DIASTOLIC BLOOD PRESSURE: 82 MMHG | SYSTOLIC BLOOD PRESSURE: 118 MMHG

## 2018-11-06 DIAGNOSIS — Z30.430 ENCOUNTER FOR INSERTION OF MIRENA IUD: Primary | ICD-10-CM

## 2018-11-06 DIAGNOSIS — R35.0 URINARY FREQUENCY: ICD-10-CM

## 2018-11-06 PROBLEM — Z97.5 IUD (INTRAUTERINE DEVICE) IN PLACE: Status: ACTIVE | Noted: 2018-11-06

## 2018-11-06 LAB
BILIRUB UR QL STRIP: NEGATIVE
CLARITY UR: CLEAR
COLOR UR: YELLOW
GLUCOSE UR STRIP-MCNC: NEGATIVE MG/DL
HGB UR QL STRIP.AUTO: NEGATIVE
KETONES UR QL STRIP: NEGATIVE
LEUKOCYTE ESTERASE UR QL STRIP.AUTO: NEGATIVE
NITRITE UR QL STRIP: NEGATIVE
PH UR STRIP.AUTO: 7 [PH] (ref 5–8)
PROT UR QL STRIP: NEGATIVE
SP GR UR STRIP: 1.01 (ref 1–1.03)
UROBILINOGEN UR QL STRIP: NORMAL

## 2018-11-06 PROCEDURE — 81003 URINALYSIS AUTO W/O SCOPE: CPT | Performed by: OBSTETRICS & GYNECOLOGY

## 2018-11-06 PROCEDURE — 58300 INSERT INTRAUTERINE DEVICE: CPT | Performed by: OBSTETRICS & GYNECOLOGY

## 2018-11-06 NOTE — PROGRESS NOTES
IUD Insertion    Patient's last menstrual period was 10/23/2018 (approximate).    Date of procedure:  11/6/2018    Risks and benefits discussed? yes  All questions answered? yes  Consents given by The patient  Written consent obtained? yes    Local anesthesia used:  no    Procedure documentation:    After verifying the patient had a low probability of being pregnant and met the criteria for insertion, a sterile speculum has placed and the cervix was cleansed with an antiseptic solution.  Vaginal discharge was scant.  The anterior lip of the cervix was grasped with a tenaculum and the uterine cavity was gently sounded. There was mild difficulty passing the sound through the cervix.  Cervical dilation did not need to be performed prior to placing the IUD.  The uterus was midposition and sounded to 9 cms.  The Mirena was then prepared per the manufacturers instructions.    The Mirena was advanced to a point 2 cms from the fundus and then the arms were released from the sheath.  The device was advanced to the fundus and the device was released fully from the sheath. The string was cut 3 cms in length.  Bleeding from the cervix was scant.    She tolerated the procedure without any difficulty.     Post procedure instructions: It was reviewed that the Mirena will not alter the timing of when she bleeds but it may decrease the quantity of flow and cramps.  Roughly 30% of people will be amenorrheic over time.  Efficacy rate of 99.2% over 5 years was discussed.  Spontaneous expulsion rate of 1-2% was also discussed.  If she has any issue with fever or excessive bleeding or pain she is to call the office immediately.  Otherwise I would like to see her back in 6 weeks with an ultrasound to confirm correct placement.    This note was electronically signed.    Kamila Pinto MD  November 6, 2018

## 2019-01-28 ENCOUNTER — TELEPHONE (OUTPATIENT)
Dept: FAMILY MEDICINE CLINIC | Facility: CLINIC | Age: 39
End: 2019-01-28

## 2019-01-28 RX ORDER — OSELTAMIVIR PHOSPHATE 75 MG/1
75 CAPSULE ORAL DAILY
Qty: 10 CAPSULE | Refills: 0 | Status: SHIPPED | OUTPATIENT
Start: 2019-01-28 | End: 2019-01-28 | Stop reason: SDUPTHER

## 2019-01-28 RX ORDER — OSELTAMIVIR PHOSPHATE 75 MG/1
75 CAPSULE ORAL DAILY
Qty: 10 CAPSULE | Refills: 0 | Status: SHIPPED | OUTPATIENT
Start: 2019-01-28 | End: 2019-02-19

## 2019-01-28 NOTE — TELEPHONE ENCOUNTER
----- Message from Lian Whitley sent at 1/28/2019 11:39 AM EST -----  Contact: PT  NEPHEW IN HER HOUSEHOLD JUST DXD WITH FLU, WANTED TO SEE IF TAMIFLU COULD BE CALLED IN PREVENTATIVELY FOR HER. OTHER FAMILY MEMBERS IN THE HOUSE HAVE RECEIVED IT. SHE IS WORRIED BECAUSE SHE FLIES OUT TO SKIP IN 5 DAYS.  ...PLEASE NOTE WHICH PHARMACY SHE IS REQUESTING....    [facility] [address] [place] [address] [address] AT [facility] [address] - [phone] PH - [phone] (Phone)  [phone] (Fax)

## 2019-01-28 NOTE — TELEPHONE ENCOUNTER
Looks like that the pharmacy was not set to where she wanted it, I sent a prescription in but it looks like it went to [facility] please call and switch to [facility] for her.

## 2019-02-19 ENCOUNTER — OFFICE VISIT (OUTPATIENT)
Dept: FAMILY MEDICINE CLINIC | Facility: CLINIC | Age: 39
End: 2019-02-19

## 2019-02-19 VITALS
HEIGHT: 71 IN | OXYGEN SATURATION: 99 % | TEMPERATURE: 97.6 F | DIASTOLIC BLOOD PRESSURE: 68 MMHG | SYSTOLIC BLOOD PRESSURE: 110 MMHG | HEART RATE: 82 BPM | BODY MASS INDEX: 30.82 KG/M2

## 2019-02-19 DIAGNOSIS — J32.9 SINUSITIS, UNSPECIFIED CHRONICITY, UNSPECIFIED LOCATION: Primary | ICD-10-CM

## 2019-02-19 PROCEDURE — 99213 OFFICE O/P EST LOW 20 MIN: CPT | Performed by: PHYSICIAN ASSISTANT

## 2019-02-19 RX ORDER — DEXTROMETHORPHAN HYDROBROMIDE AND PROMETHAZINE HYDROCHLORIDE 15; 6.25 MG/5ML; MG/5ML
SYRUP ORAL
Qty: 180 ML | Refills: 0 | Status: SHIPPED | OUTPATIENT
Start: 2019-02-19 | End: 2019-04-11

## 2019-02-19 RX ORDER — CLARITHROMYCIN 500 MG/1
500 TABLET, COATED ORAL 2 TIMES DAILY
Qty: 20 TABLET | Refills: 0 | Status: SHIPPED | OUTPATIENT
Start: 2019-02-19 | End: 2019-04-11

## 2019-02-19 NOTE — PROGRESS NOTES
Subjective   Lita Varma is a 38 y.o. female  Sinus Problem (Sinus pain/pressure with headache x2 weeks); Dizziness (increased dizziness x1 week ); and Sore Throat (sore throat x1 week )      History of Present Illness  Patient comes in today concerned with ongoing persistent sinus pain and pressure for the past 2 weeks dizziness for the past week, sore throat and right ear pain with decreased hearing.  She was treated with antibiotic from a walk-in clinic in January and states that her symptoms never went away.  She does not recall the name of the antibiotic.  The following portions of the patient's history were reviewed and updated as appropriate: allergies, current medications, past social history and problem list    Review of Systems   Constitutional: Negative for chills, fatigue and fever.   HENT: Positive for congestion, ear pain, postnasal drip, rhinorrhea and sinus pressure. Negative for sore throat.    Eyes: Negative for pain.   Respiratory: Positive for cough. Negative for shortness of breath.    Neurological: Positive for headaches. Negative for dizziness.   Hematological: Negative for adenopathy.       Objective     Vitals:    02/19/19 0958   BP: 110/68   Pulse: 82   Temp: 97.6 °F (36.4 °C)   SpO2: 99%       Physical Exam   Constitutional: She is oriented to person, place, and time. She appears well-developed and well-nourished. No distress.   HENT:   Head: Normocephalic and atraumatic.   Right Ear: Tympanic membrane and ear canal normal.   Left Ear: Tympanic membrane and ear canal normal.   Nose: Mucosal edema, rhinorrhea and sinus tenderness present. Right sinus exhibits maxillary sinus tenderness and frontal sinus tenderness. Left sinus exhibits maxillary sinus tenderness and frontal sinus tenderness.   Mouth/Throat: Oropharynx is clear and moist. No oropharyngeal exudate.   Eyes: Pupils are equal, round, and reactive to light.   Cardiovascular: Normal rate and regular rhythm.   Pulmonary/Chest:  Effort normal and breath sounds normal.   Neurological: She is alert and oriented to person, place, and time.   Skin: She is not diaphoretic.   Nursing note and vitals reviewed.      Assessment/Plan     Diagnoses and all orders for this visit:    Sinusitis, unspecified chronicity, unspecified location    Other orders  -     clarithromycin (BIAXIN) 500 MG tablet; Take 1 tablet by mouth 2 (Two) Times a Day.  -     promethazine-dextromethorphan (PROMETHAZINE-DM) 6.25-15 MG/5ML syrup; Take 1-2 tsp every 4 hours as needed for cough

## 2019-03-01 ENCOUNTER — TELEPHONE (OUTPATIENT)
Dept: FAMILY MEDICINE CLINIC | Facility: CLINIC | Age: 39
End: 2019-03-01

## 2019-03-01 RX ORDER — METHYLPREDNISOLONE 4 MG/1
TABLET ORAL
Qty: 21 EACH | Refills: 0 | Status: SHIPPED | OUTPATIENT
Start: 2019-03-01 | End: 2019-04-11

## 2019-03-01 NOTE — TELEPHONE ENCOUNTER
----- Message from Lian Whitley sent at 3/1/2019  8:24 AM EST -----  Contact: PT  EAR STILL HURTING, SAW NATALIE A WEEK AGO. FINISHED ANTIBIOTIC YESTERDAY. NATALIE TOLD HER TO MAKE SURE AND CALL IF IT WAS STILL HURTING AFTER ANTIBIOTICS WERE COMPLETED    PLEASE CALL PT [phone]

## 2019-04-11 ENCOUNTER — HOSPITAL ENCOUNTER (OUTPATIENT)
Dept: GENERAL RADIOLOGY | Facility: HOSPITAL | Age: 39
Discharge: HOME | End: 2019-04-11
Admitting: PHYSICIAN ASSISTANT

## 2019-04-11 ENCOUNTER — LAB (OUTPATIENT)
Dept: LAB | Facility: HOSPITAL | Age: 39
End: 2019-04-11

## 2019-04-11 ENCOUNTER — OFFICE VISIT (OUTPATIENT)
Dept: FAMILY MEDICINE CLINIC | Facility: CLINIC | Age: 39
End: 2019-04-11

## 2019-04-11 VITALS
BODY MASS INDEX: 30.82 KG/M2 | SYSTOLIC BLOOD PRESSURE: 118 MMHG | TEMPERATURE: 98.5 F | HEART RATE: 87 BPM | HEIGHT: 71 IN | OXYGEN SATURATION: 99 % | DIASTOLIC BLOOD PRESSURE: 64 MMHG

## 2019-04-11 DIAGNOSIS — R60.0 LEG EDEMA: ICD-10-CM

## 2019-04-11 DIAGNOSIS — M54.2 NECK PAIN: ICD-10-CM

## 2019-04-11 DIAGNOSIS — M54.2 NECK PAIN: Primary | ICD-10-CM

## 2019-04-11 DIAGNOSIS — G44.201 INTRACTABLE TENSION-TYPE HEADACHE, UNSPECIFIED CHRONICITY PATTERN: ICD-10-CM

## 2019-04-11 DIAGNOSIS — D64.9 ANEMIA, UNSPECIFIED TYPE: ICD-10-CM

## 2019-04-11 PROCEDURE — 83735 ASSAY OF MAGNESIUM: CPT

## 2019-04-11 PROCEDURE — 82607 VITAMIN B-12: CPT

## 2019-04-11 PROCEDURE — 83540 ASSAY OF IRON: CPT

## 2019-04-11 PROCEDURE — 80053 COMPREHEN METABOLIC PANEL: CPT

## 2019-04-11 PROCEDURE — 85027 COMPLETE CBC AUTOMATED: CPT | Performed by: PHYSICIAN ASSISTANT

## 2019-04-11 PROCEDURE — 72040 X-RAY EXAM NECK SPINE 2-3 VW: CPT

## 2019-04-11 PROCEDURE — 99214 OFFICE O/P EST MOD 30 MIN: CPT | Performed by: PHYSICIAN ASSISTANT

## 2019-04-11 PROCEDURE — 82746 ASSAY OF FOLIC ACID SERUM: CPT

## 2019-04-11 PROCEDURE — 36415 COLL VENOUS BLD VENIPUNCTURE: CPT | Performed by: PHYSICIAN ASSISTANT

## 2019-04-11 RX ORDER — METHOCARBAMOL 500 MG/1
TABLET, FILM COATED ORAL
Qty: 60 TABLET | Refills: 3 | Status: SHIPPED | OUTPATIENT
Start: 2019-04-11 | End: 2020-07-31

## 2019-04-11 NOTE — PROGRESS NOTES
Subjective   Lita Varma is a 38 y.o. female  Headache (ongoing headache in the back part of head x1 month ) and Leg Swelling (bilateral leg swelling, increased traveling recently x1 month )      History of Present Illness  Patient comes in for 2 separate problems which are new and ongoing.  She states for the past month she has been having ongoing daily headache in the back of her neck.  She states it hurts at the base of her skull.  She states she feels that somewhat around to the front of her neck.  No pain with swallowing but feels an increased awareness of swallowing.  No sore throat.  No neck trauma.  No numbness or weakness in arms.  She is also noticed increased swelling in both of her lower extremities for the past month after traveling.  She states she started feeling a tightness in both of her legs when she was flying to [place] several weeks ago, her legs felt tight and swollen while she was there and the swelling worsened on the way home.  She states that the swelling has improved somewhat but last weekend when she drove to [place] after couple of hours of driving she noticed a tightness in her legs and some swelling.  She denies shortness of breath or chest pain.  She states she feels a tightness in her legs when she exercises now.  She has never experienced this in the past.  Has no history of DVTs.  Patient is status post gastric sleeve surgery, does take her vitamin supplement pack regularly but is overdue for her labs to be checked regarding her vitamin levels.  The following portions of the patient's history were reviewed and updated as appropriate: allergies, current medications, past social history and problem list    Review of Systems   Constitutional: Negative for fatigue and unexpected weight change.   HENT: Negative for congestion, dental problem, postnasal drip, sinus pressure and sore throat.    Eyes: Negative for photophobia, pain and visual disturbance.   Respiratory: Negative  for choking, chest tightness and shortness of breath.    Cardiovascular: Positive for leg swelling. Negative for chest pain and palpitations.   Gastrointestinal: Negative for nausea and vomiting.   Musculoskeletal: Positive for myalgias, neck pain and neck stiffness.   Neurological: Positive for headaches. Negative for dizziness, syncope, facial asymmetry, speech difficulty, weakness, light-headedness and numbness.   Psychiatric/Behavioral: Negative for agitation, confusion, dysphoric mood and sleep disturbance. The patient is not nervous/anxious.        Objective     Vitals:    04/11/19 1438   BP: 118/64   Pulse: 87   Temp: 98.5 °F (36.9 °C)   SpO2: 99%       Physical Exam   Constitutional: She is oriented to person, place, and time. She appears well-developed and well-nourished. No distress.   HENT:   Head: Normocephalic and atraumatic.   Eyes: Conjunctivae and EOM are normal. Pupils are equal, round, and reactive to light.   Neck: Normal range of motion. Neck supple. No JVD present. No tracheal deviation present. No thyromegaly present.   Cardiovascular: Normal rate, regular rhythm, normal heart sounds and intact distal pulses.   Pulmonary/Chest: Effort normal and breath sounds normal.   Musculoskeletal: Normal range of motion. She exhibits tenderness ( Tenderness bilateral calves, no edema noted). She exhibits no edema.   Lymphadenopathy:     She has no cervical adenopathy.   Neurological: She is alert and oriented to person, place, and time. She displays normal reflexes. No cranial nerve deficit or sensory deficit. She exhibits normal muscle tone. Coordination normal.   Skin: Skin is warm and dry. No rash noted. She is not diaphoretic. No erythema. There is pallor.   Psychiatric: She has a normal mood and affect. Her speech is normal and behavior is normal. Judgment and thought content normal. Cognition and memory are normal.   Nursing note and vitals reviewed.      Assessment/Plan     Diagnoses and all orders  for this visit:    Neck pain  -     XR Spine Cervical 2 or 3 View; Future  -     Magnesium; Future    Leg edema  -     Comprehensive metabolic panel; Future  -     Magnesium; Future    Anemia, unspecified type  -     CBC (No Diff)  -     Iron; Future  -     Vitamin B12; Future  -     Magnesium; Future  -     Folate; Future    Intractable tension-type headache, unspecified chronicity pattern    Schedule patient for bilateral lower extremity venous Doppler.  Advised patient go to ER promptly if symptoms worsen acutely regarding pain pain or swelling in legs.

## 2019-04-12 LAB
ALBUMIN SERPL-MCNC: 4.5 G/DL (ref 3.5–5.2)
ALBUMIN/GLOB SERPL: 1.7 G/DL
ALP SERPL-CCNC: 57 U/L (ref 39–117)
ALT SERPL W P-5'-P-CCNC: 22 U/L (ref 1–33)
ANION GAP SERPL CALCULATED.3IONS-SCNC: 11 MMOL/L
AST SERPL-CCNC: 15 U/L (ref 1–32)
BILIRUB SERPL-MCNC: 0.3 MG/DL (ref 0.2–1.2)
BUN BLD-MCNC: 18 MG/DL (ref 6–20)
BUN/CREAT SERPL: 26.5 (ref 7–25)
CALCIUM SPEC-SCNC: 9 MG/DL (ref 8.6–10.5)
CHLORIDE SERPL-SCNC: 99 MMOL/L (ref 98–107)
CO2 SERPL-SCNC: 27 MMOL/L (ref 22–29)
CREAT BLD-MCNC: 0.68 MG/DL (ref 0.57–1)
DEPRECATED RDW RBC AUTO: 44.1 FL (ref 37–54)
ERYTHROCYTE [DISTWIDTH] IN BLOOD BY AUTOMATED COUNT: 12.3 % (ref 12.3–15.4)
FOLATE SERPL-MCNC: 14.5 NG/ML (ref 4.78–24.2)
GFR SERPL CREATININE-BSD FRML MDRD: 98 ML/MIN/1.73
GLOBULIN UR ELPH-MCNC: 2.7 GM/DL
GLUCOSE BLD-MCNC: 87 MG/DL (ref 65–99)
HCT VFR BLD AUTO: 43.4 % (ref 34–46.6)
HGB BLD-MCNC: 14.6 G/DL (ref 12–15.9)
IRON 24H UR-MRATE: 55 MCG/DL (ref 37–145)
MAGNESIUM SERPL-MCNC: 1.9 MG/DL (ref 1.6–2.6)
MCH RBC QN AUTO: 32.7 PG (ref 26.6–33)
MCHC RBC AUTO-ENTMCNC: 33.6 G/DL (ref 31.5–35.7)
MCV RBC AUTO: 97.1 FL (ref 79–97)
PLATELET # BLD AUTO: 213 10*3/MM3 (ref 140–450)
PMV BLD AUTO: 10 FL (ref 6–12)
POTASSIUM BLD-SCNC: 4 MMOL/L (ref 3.5–5.2)
PROT SERPL-MCNC: 7.2 G/DL (ref 6–8.5)
RBC # BLD AUTO: 4.47 10*6/MM3 (ref 3.77–5.28)
SODIUM BLD-SCNC: 137 MMOL/L (ref 136–145)
VIT B12 BLD-MCNC: 1769 PG/ML (ref 211–946)
WBC NRBC COR # BLD: 6.19 10*3/MM3 (ref 3.4–10.8)

## 2019-04-25 ENCOUNTER — TELEPHONE (OUTPATIENT)
Dept: FAMILY MEDICINE CLINIC | Facility: CLINIC | Age: 39
End: 2019-04-25

## 2019-04-25 NOTE — TELEPHONE ENCOUNTER
----- Message from Lana Sotelo PA-C sent at 4/24/2019  5:53 PM EDT -----  Please send a copy of the actual lab results with letter to patient.

## 2019-08-06 ENCOUNTER — TELEPHONE (OUTPATIENT)
Dept: FAMILY MEDICINE CLINIC | Facility: CLINIC | Age: 39
End: 2019-08-06

## 2019-08-06 NOTE — TELEPHONE ENCOUNTER
----- Message from Aleisha Lin sent at 8/6/2019  9:55 AM EDT -----  Contact: PT.  PT. SEE'S NATALIE.  PT. IS GOING ON A CRUISE & HAS MOTION SICKNESS.  PT. WOULD LIKE SOMETHING CALLED IN FOR ABOVE SYMPTOM.    RX=[facility].    PT. CAN BE REACHED @ [facility] #.

## 2019-10-08 ENCOUNTER — TELEPHONE (OUTPATIENT)
Dept: OBSTETRICS AND GYNECOLOGY | Facility: CLINIC | Age: 39
End: 2019-10-08

## 2019-10-08 NOTE — TELEPHONE ENCOUNTER
Provider Name  Dr Pinto    Reason for Call  Patient having irregular bleeding , has IUD, has questions, please call her tomorrow 10/9/19    Call Back Number  [phone]

## 2019-10-09 NOTE — TELEPHONE ENCOUNTER
Spoke with Lita she is c/o regular period like bleeding despite having a Mirena in place. Advised pt a small number of women maintain regular menstration even with IUD in place. Pt verbalizes understanding.

## 2020-01-16 ENCOUNTER — OFFICE VISIT (OUTPATIENT)
Dept: OBSTETRICS AND GYNECOLOGY | Facility: CLINIC | Age: 40
End: 2020-01-16

## 2020-01-16 VITALS — HEIGHT: 71 IN | DIASTOLIC BLOOD PRESSURE: 64 MMHG | BODY MASS INDEX: 30.82 KG/M2 | SYSTOLIC BLOOD PRESSURE: 108 MMHG

## 2020-01-16 DIAGNOSIS — Z71.85 IMMUNIZATION COUNSELING: ICD-10-CM

## 2020-01-16 DIAGNOSIS — Z01.419 WELL WOMAN EXAM: Primary | ICD-10-CM

## 2020-01-16 DIAGNOSIS — Z97.5 IUD (INTRAUTERINE DEVICE) IN PLACE: ICD-10-CM

## 2020-01-16 PROCEDURE — 99395 PREV VISIT EST AGE 18-39: CPT | Performed by: OBSTETRICS & GYNECOLOGY

## 2020-01-16 RX ORDER — SODIUM FLUORIDE 6 MG/ML
PASTE, DENTIFRICE DENTAL
COMMUNITY
Start: 2019-11-22 | End: 2021-04-07

## 2020-01-16 NOTE — PROGRESS NOTES
Subjective   Chief Complaint   Patient presents with   • Gynecologic Exam     pt here for annual. last pap 10/1/18 negative, hpv non 16/18 positive. pt refused to get on scale     Lita Varma is a 39 y.o. year old  presenting to be seen for her annual exam.     SEXUAL Hx:  She is currently sexually active.  In the past year there there has been NO new sexual partners.    Condoms are never used.  She would not like to be screened for STD's at today's exam.  Current birth control method: IUD - Mirena.  She is happy with her current method of contraception and does not want to discuss alternative methods of contraception.  MENSTRUAL Hx:  Patient's last menstrual period was 2020 (within days).  In the past 6 months her cycles have been regular, predictable and occur monthly.  Her menstrual flow is typically light and flow is typically normal.   Each month on average there are roughly 0 day(s) of very heavy flow.    Intermenstrual bleeding is very infrequently .    Post-coital bleeding is absent.  Dysmenorrhea: mild and is not affecting her activities of daily living  PMS: mild and is not affecting her activities of daily living  Her cycles are not a source of concern for her that she wishes to discuss today.  HEALTH Hx:  She exercises regularly: yes.  She wears her seat belt: no.   She has concerns about domestic violence: no.  OTHER THINGS SHE WANTS TO DISCUSS TODAY:  Nothing else    The following portions of the patient's history were reviewed and updated as appropriate:problem list, current medications, allergies, past family history, past medical history, past social history and past surgical history.    Social History    Tobacco Use      Smoking status: Never Smoker      Smokeless tobacco: Never Used    Review of Systems  Constitutional POS: nothing reported    NEG: anorexia or night sweats   Genitourinary POS: frequency    NEG: dysuria or hematuria      Gastointestinal POS: nothing reported     "NEG: bloating, change in bowel habits, melena or reflux symptoms   Integument POS: nothing reported    NEG: moles that are changing in size, shape, color or rashes   Breast POS: nothing reported    NEG: persistent breast lump, skin dimpling or nipple discharge        Objective   /64   Ht 180.3 cm (71\")   LMP 01/06/2020 (Within Days)   Breastfeeding No   BMI 30.82 kg/m²     General:  well developed; well nourished  no acute distress   Skin:  No suspicious lesions seen   Thyroid: normal to inspection and palpation   Breasts:  Examined in supine position  Symmetric without masses or skin dimpling  Nipples normal without inversion, lesions or discharge  There are no palpable axillary nodes   Abdomen: soft, non-tender; no masses  no umbilical or inguinal hernias are present  no hepato-splenomegaly   Pelvis: Clinical staff was present for exam  External genitalia:  normal appearance of the external genitalia including Bartholin's and Fabens's glands.  :  urethral meatus normal;  Vaginal:  normal pink mucosa without prolapse or lesions.  Cervix:  normal appearance. IUD string present - 2.5 cms in length;  Uterus:  normal size, shape and consistency. anteverted;  Adnexa:  normal bimanual exam of the adnexa.  Rectal:  digital rectal exam not performed; anus visually normal appearing.        Assessment   1. Normal GYN exam  2. Mirena IUD in place  3. Immunization counseling     Plan   Pap was done today.  If she does not receive the results of the Pap within 2 weeks  time, she was instructed to call to find out the results.  I explained to Lita that because she is being seen in follow-up of a previously abnormal Pap smear, her next Pap will be done depending on the results from today. She was supposed to have a colposcopy last year but missed appointments.   1. No prescription was given or electronically sent at today's visit   2. Discussed HPV Vaccination and she will call insurance company and let us know. " Highly recommended this for patient.   3. The importance of keeping all planned follow-up and taking all medications as prescribed was emphasized.  4. Follow up for annual exam in one year    No orders of the defined types were placed in this encounter.         This note was electronically signed.    Kamila Pinto MD  January 16, 2020    Note: Speech recognition transcription software may have been used to create portions of this document.  An attempt at proofreading has been made but errors in transcription could still be present.

## 2020-02-06 ENCOUNTER — OFFICE VISIT (OUTPATIENT)
Dept: OBSTETRICS AND GYNECOLOGY | Facility: CLINIC | Age: 40
End: 2020-02-06

## 2020-02-06 VITALS
HEIGHT: 71 IN | SYSTOLIC BLOOD PRESSURE: 112 MMHG | DIASTOLIC BLOOD PRESSURE: 70 MMHG | BODY MASS INDEX: 35.78 KG/M2 | WEIGHT: 255.6 LBS

## 2020-02-06 DIAGNOSIS — R87.612 LGSIL ON PAP SMEAR OF CERVIX: Primary | ICD-10-CM

## 2020-02-06 DIAGNOSIS — Z13.9 SPECIAL SCREENING: ICD-10-CM

## 2020-02-06 LAB
B-HCG UR QL: NEGATIVE
INTERNAL NEGATIVE CONTROL: NEGATIVE
INTERNAL POSITIVE CONTROL: POSITIVE
Lab: NORMAL

## 2020-02-06 PROCEDURE — 81025 URINE PREGNANCY TEST: CPT | Performed by: OBSTETRICS & GYNECOLOGY

## 2020-02-06 PROCEDURE — 57454 BX/CURETT OF CERVIX W/SCOPE: CPT | Performed by: OBSTETRICS & GYNECOLOGY

## 2020-02-06 NOTE — PROGRESS NOTES
Colposcopy    Date of procedure:  2/6/2020   Risks and benefits discussed? yes   All questions answered? yes   Consents given by: patient   Written consent obtained? yes   Pre-op indication: LGSIL with +non 16/18 HPV          Procedure documentation:  The cervix was initially viewed colposcopically through a green filter.  The cervix was next bathed in acetic acid.   The findings were as follows:    Transformation zone seen? Yes   Findings: 1. HPV changes noted at 12 o'clock   Ectocervical biopsies: taken from 12 o'clock.  Monsels solution was applied to the biopsy sites.   Endocervical curettage: performed               Colposcopic Impression: 1. Overall normal appearing cervix with HPV changes   2. Adequate colposcopy  3. Colposcopic findings are consistent with cytology       Plan: Will base further treatment on pathology results         This note was electronically signed.    Kamila Pinto MD  February 6, 2020

## 2020-02-08 ENCOUNTER — TELEPHONE (OUTPATIENT)
Dept: OBSTETRICS AND GYNECOLOGY | Facility: CLINIC | Age: 40
End: 2020-02-08

## 2020-02-08 NOTE — TELEPHONE ENCOUNTER
Patient can be informed her biopsy from colposcopy was only mild dysplasia and endocervical curetting was normal. As long as she does not have any questions or issues she can cancel her results discussion appointment and will need repeat pap co test in one year.   Thanks,   Kamila Pinto MD

## 2020-07-31 ENCOUNTER — OFFICE VISIT (OUTPATIENT)
Dept: FAMILY MEDICINE CLINIC | Facility: CLINIC | Age: 40
End: 2020-07-31

## 2020-07-31 VITALS
DIASTOLIC BLOOD PRESSURE: 70 MMHG | BODY MASS INDEX: 35.65 KG/M2 | HEIGHT: 71 IN | OXYGEN SATURATION: 99 % | SYSTOLIC BLOOD PRESSURE: 118 MMHG | HEART RATE: 83 BPM | TEMPERATURE: 97.8 F

## 2020-07-31 DIAGNOSIS — F43.21 SITUATIONAL DEPRESSION: Primary | ICD-10-CM

## 2020-07-31 DIAGNOSIS — G43.009 MIGRAINE WITHOUT AURA AND WITHOUT STATUS MIGRAINOSUS, NOT INTRACTABLE: ICD-10-CM

## 2020-07-31 PROCEDURE — 99214 OFFICE O/P EST MOD 30 MIN: CPT | Performed by: PHYSICIAN ASSISTANT

## 2020-07-31 RX ORDER — ONDANSETRON 8 MG/1
8 TABLET, ORALLY DISINTEGRATING ORAL EVERY 8 HOURS PRN
Qty: 15 TABLET | Refills: 5 | Status: SHIPPED | OUTPATIENT
Start: 2020-07-31

## 2020-07-31 RX ORDER — SUMATRIPTAN 100 MG/1
TABLET, FILM COATED ORAL
Qty: 10 TABLET | Refills: 5 | Status: SHIPPED | OUTPATIENT
Start: 2020-07-31 | End: 2020-12-02

## 2020-07-31 RX ORDER — BUPROPION HYDROCHLORIDE 150 MG/1
150 TABLET ORAL DAILY
Qty: 30 TABLET | Refills: 1 | Status: SHIPPED | OUTPATIENT
Start: 2020-07-31 | End: 2020-08-18 | Stop reason: DRUGHIGH

## 2020-07-31 NOTE — PROGRESS NOTES
Subjective   Lita Varma is a 39 y.o. female  Migraine (active migraine x5 days) and Depression (increased depression amd anxiety for past few months )      History of Present Illness  Patient is a pleasant 49 old white female who presents today for evaluation treatment of persistent low mood for the past several months.  States he feels apathetic, no motivation to do things that she used to enjoy.  She is not homicidal or suicidal.  She states she has minimal anxiety but mainly more just low mood.  She is going to the gym each day but she states she does enjoy working out but estimate she gets home because of energy she felt well working out is gone.  Her job change significantly worsened work from home the majority of the time since June.  Also patient is noticing more migraines lately.  She has done well with Imitrex in the past and does not have a good prescription.  She states she developed migraine on Tuesday and had an old injection times helped little bit.  She is minimal headache today.  The following portions of the patient's history were reviewed and updated as appropriate: allergies, current medications, past social history and problem list    Review of Systems   Constitutional: Negative for appetite change, fatigue and unexpected weight change.   HENT: Negative for congestion, dental problem, postnasal drip, sinus pressure and sore throat.    Eyes: Negative for photophobia, pain and visual disturbance.   Respiratory: Negative for chest tightness and shortness of breath.    Cardiovascular: Negative.    Gastrointestinal: Negative for abdominal pain, diarrhea, nausea and vomiting.   Neurological: Positive for headaches. Negative for dizziness, tremors, syncope, facial asymmetry, speech difficulty, weakness, light-headedness and numbness.   Psychiatric/Behavioral: Positive for dysphoric mood. Negative for agitation, behavioral problems, confusion, decreased concentration, sleep disturbance and suicidal  ideas. The patient is not nervous/anxious.        Objective     Vitals:    07/31/20 0910   BP: 118/70   Pulse: 83   Temp: 97.8 °F (36.6 °C)   SpO2: 99%       Physical Exam   Constitutional: She is oriented to person, place, and time. She appears well-developed and well-nourished. She does not have a sickly appearance. She does not appear ill. No distress.   HENT:   Head: Normocephalic and atraumatic.   Eyes: Pupils are equal, round, and reactive to light. Conjunctivae and EOM are normal.   Neck: Normal range of motion. Neck supple. No thyroid mass and no thyromegaly present.   Cardiovascular: Normal rate, regular rhythm and normal heart sounds.   Pulmonary/Chest: Effort normal. No respiratory distress.   Neurological: She is alert and oriented to person, place, and time. No cranial nerve deficit or sensory deficit.   Skin: No rash noted. She is not diaphoretic. No erythema. No pallor.   Psychiatric: Her speech is normal and behavior is normal. Judgment and thought content normal. Her mood appears not anxious. Her affect is blunt. Her affect is not angry and not inappropriate. Cognition and memory are normal. She does not exhibit a depressed mood. She is attentive.   Nursing note and vitals reviewed.      Assessment/Plan     Lita was seen today for migraine and depression.    Diagnoses and all orders for this visit:    Situational depression    Migraine without aura and without status migrainosus, not intractable    Other orders  -     buPROPion XL (Wellbutrin XL) 150 MG 24 hr tablet; Take 1 tablet by mouth Daily.  -     SUMAtriptan (Imitrex) 100 MG tablet; Take one tablet at onset of headache. May repeat dose one time in 2 hours if headache not relieved.  -     ondansetron ODT (Zofran ODT) 8 MG disintegrating tablet; Place 1 tablet on the tongue Every 8 (Eight) Hours As Needed for Nausea or Vomiting.    Follow-up in 1 month for recheck sooner if symptoms worsen or adverse effects noted for medication peer

## 2020-08-18 ENCOUNTER — TELEPHONE (OUTPATIENT)
Dept: FAMILY MEDICINE CLINIC | Facility: CLINIC | Age: 40
End: 2020-08-18

## 2020-08-18 RX ORDER — BUPROPION HYDROCHLORIDE 300 MG/1
300 TABLET ORAL DAILY
Qty: 30 TABLET | Refills: 2 | Status: SHIPPED | OUTPATIENT
Start: 2020-08-18 | End: 2020-12-02 | Stop reason: SDUPTHER

## 2020-08-18 NOTE — TELEPHONE ENCOUNTER
Yes, increase dosage of Wellbutrin XL to 300 mg each morning #30 with 2 refills, please call in new prescription to pharmacy.  Have her follow-up with me in the office for recheck in 2 weeks.

## 2020-08-18 NOTE — TELEPHONE ENCOUNTER
Patient was put on Wellbutrin 150 mg 2 weeks ago and was told to call back and report it that dosage was working or not. Patient said she did good the first week and then it started dipping into spouts of depression again. Wanted to know if it can be increased. Please call her back [phone]

## 2020-12-02 ENCOUNTER — OFFICE VISIT (OUTPATIENT)
Dept: FAMILY MEDICINE CLINIC | Facility: CLINIC | Age: 40
End: 2020-12-02

## 2020-12-02 VITALS
DIASTOLIC BLOOD PRESSURE: 76 MMHG | OXYGEN SATURATION: 99 % | BODY MASS INDEX: 35.65 KG/M2 | TEMPERATURE: 97.5 F | HEART RATE: 88 BPM | HEIGHT: 71 IN | SYSTOLIC BLOOD PRESSURE: 128 MMHG

## 2020-12-02 DIAGNOSIS — F43.21 SITUATIONAL DEPRESSION: Primary | ICD-10-CM

## 2020-12-02 DIAGNOSIS — G43.009 MIGRAINE WITHOUT AURA AND WITHOUT STATUS MIGRAINOSUS, NOT INTRACTABLE: ICD-10-CM

## 2020-12-02 PROCEDURE — 99214 OFFICE O/P EST MOD 30 MIN: CPT | Performed by: PHYSICIAN ASSISTANT

## 2020-12-02 RX ORDER — ELETRIPTAN HYDROBROMIDE 40 MG/1
40 TABLET, FILM COATED ORAL ONCE AS NEEDED
Qty: 10 TABLET | Refills: 11 | Status: SHIPPED | OUTPATIENT
Start: 2020-12-02

## 2020-12-02 RX ORDER — AMITRIPTYLINE HYDROCHLORIDE 25 MG/1
25 TABLET, FILM COATED ORAL NIGHTLY
Qty: 30 TABLET | Refills: 11 | Status: SHIPPED | OUTPATIENT
Start: 2020-12-02 | End: 2021-02-02 | Stop reason: SDUPTHER

## 2020-12-02 RX ORDER — BUPROPION HYDROCHLORIDE 300 MG/1
300 TABLET ORAL DAILY
Qty: 90 TABLET | Refills: 3 | Status: SHIPPED | OUTPATIENT
Start: 2020-12-02 | End: 2021-11-19 | Stop reason: SDUPTHER

## 2020-12-02 NOTE — PROGRESS NOTES
Subjective   Lita Varma is a 40 y.o. female  Depression (follow up on depression, needs refill on Wellbutrin 90 day supply )      History of Present Illness  Patient is a pleasant 40-year-old white female who comes in today for evaluation treatment of 2 separate medical conditions.  1 is chronic medical condition stable medication due for refills and second is a chronic medical condition uncontrolled, unstable and needing further evaluation treatment.  Situational depression associated with the pandemic continues to be a struggle for patient as she states she has more stress at work but she does feel that she is coping much better with it due to the increased dosage of 300 mg daily bupropion.  She feels that in general her mood is good on a daily basis and she feels this medicine is working well without giving her any side effects.  Second problem is of migraine headaches.  States is getting more often, typically getting around 2 to 3/week and finds that the sumatriptan is causing her nausea and therefore unable to be used for treatment.  She feels that the increased frequency of migraines is due to her stress at work.  Pattern is the same when she does have a migraine it is just happening more frequently.  The following portions of the patient's history were reviewed and updated as appropriate: allergies, current medications, past social history and problem list    Review of Systems   Constitutional: Negative for appetite change, fatigue and unexpected weight change.   HENT: Negative for congestion, dental problem, postnasal drip, sinus pressure and sore throat.    Eyes: Positive for visual disturbance. Negative for photophobia and pain.   Respiratory: Negative for chest tightness and shortness of breath.    Gastrointestinal: Negative for abdominal pain, diarrhea, nausea and vomiting.   Neurological: Positive for headaches. Negative for dizziness, tremors, syncope, facial asymmetry, speech difficulty,  weakness, light-headedness and numbness.   Psychiatric/Behavioral: Positive for dysphoric mood ( Stable on medication). Negative for agitation, behavioral problems, confusion, decreased concentration, sleep disturbance and suicidal ideas. The patient is not nervous/anxious.        Objective     Vitals:    12/02/20 1007   BP: 128/76   Pulse: 88   Temp: 97.5 °F (36.4 °C)   SpO2: 99%       Physical Exam  Vitals signs and nursing note reviewed.   Constitutional:       General: She is not in acute distress.     Appearance: Normal appearance. She is well-developed. She is not ill-appearing, toxic-appearing or diaphoretic.   HENT:      Head: Normocephalic and atraumatic.   Eyes:      Conjunctiva/sclera: Conjunctivae normal.      Pupils: Pupils are equal, round, and reactive to light.   Neck:      Musculoskeletal: Normal range of motion and neck supple.      Thyroid: No thyroid mass or thyromegaly.   Cardiovascular:      Rate and Rhythm: Normal rate and regular rhythm.      Heart sounds: Normal heart sounds.   Pulmonary:      Effort: Pulmonary effort is normal.   Neurological:      Mental Status: She is alert and oriented to person, place, and time.      Cranial Nerves: No cranial nerve deficit.      Sensory: No sensory deficit.   Psychiatric:         Attention and Perception: Attention and perception normal. She is attentive.         Mood and Affect: Mood and affect normal. Mood is not anxious or depressed. Affect is not angry or inappropriate.         Speech: Speech normal.         Behavior: Behavior normal. Behavior is cooperative.         Thought Content: Thought content normal.         Cognition and Memory: Cognition and memory normal.         Judgment: Judgment normal.         Assessment/Plan     Diagnoses and all orders for this visit:    1. Situational depression (Primary)    2. Migraine without aura and without status migrainosus, not intractable    Other orders  -     eletriptan (Relpax) 40 MG tablet; Take 1  tablet by mouth 1 (One) Time As Needed for Migraine for up to 1 dose. may repeat in 2 hours if necessary  Dispense: 10 tablet; Refill: 11  -     amitriptyline (ELAVIL) 25 MG tablet; Take 1 tablet by mouth Every Night. For migraine prevention  Dispense: 30 tablet; Refill: 11  -     buPROPion XL (Wellbutrin XL) 300 MG 24 hr tablet; Take 1 tablet by mouth Daily.  Dispense: 90 tablet; Refill: 3    Follow-up in 6 months and as needed.

## 2021-01-19 ENCOUNTER — OFFICE VISIT (OUTPATIENT)
Dept: OBSTETRICS AND GYNECOLOGY | Facility: CLINIC | Age: 41
End: 2021-01-19

## 2021-01-19 VITALS — SYSTOLIC BLOOD PRESSURE: 104 MMHG | DIASTOLIC BLOOD PRESSURE: 66 MMHG | HEIGHT: 71 IN | BODY MASS INDEX: 35.65 KG/M2

## 2021-01-19 DIAGNOSIS — R10.32 LEFT LOWER QUADRANT PAIN: ICD-10-CM

## 2021-01-19 DIAGNOSIS — Z01.419 WELL WOMAN EXAM WITH ROUTINE GYNECOLOGICAL EXAM: Primary | ICD-10-CM

## 2021-01-19 DIAGNOSIS — Z87.42 HISTORY OF ABNORMAL CERVICAL PAP SMEAR: ICD-10-CM

## 2021-01-19 PROCEDURE — 99395 PREV VISIT EST AGE 18-39: CPT | Performed by: OBSTETRICS & GYNECOLOGY

## 2021-01-19 PROCEDURE — 81003 URINALYSIS AUTO W/O SCOPE: CPT | Performed by: OBSTETRICS & GYNECOLOGY

## 2021-01-19 NOTE — PROGRESS NOTES
"Subjective   Chief Complaint   Patient presents with   • Gynecologic Exam     annual. last pap 20 LSIL.HPV non 16/18 positive. pt c/o pain/ cramping on her lower left side.      Lita Varma is a 40 y.o. year old  presenting to be seen for her annual exam.     SEXUAL Hx:  She is currently sexually active.  In the past year there there has been NO new sexual partners.    Condoms are never used.  She would not like to be screened for STD's at today's exam.  Current birth control method: IUD - Mirena.  She is happy with her current method of contraception and does not want to discuss alternative methods of contraception.  MENSTRUAL Hx:  LMP unsure   In the past 6 months her cycles have been regular, predictable and occur monthly.  Her menstrual flow is typically light.   Each month on average there are roughly 0 day(s) of very heavy flow.    Intermenstrual bleeding is absent.    Post-coital bleeding is absent.  Dysmenorrhea: none  PMS: none  Her cycles are not a source of concern for her that she wishes to discuss today.  HEALTH Hx:  She exercises regularly: yes.  She wears her seat belt: sometimes  She has concerns about domestic violence: no.  OTHER THINGS SHE WANTS TO DISCUSS TODAY:  She reports \"having a pain that started about 2 months ago\" that she first noticed when she had her period but was cramping way more on one side but has not gone all the way away. Reports the pain was really bad last night. \"Almost like a cramping pain\". Alleviating factors - local heat. Aggravating factors - sometimes when exercising by the end of it. Sometimes the pain takes up more space but non radiating.     The following portions of the patient's history were reviewed and updated as appropriate:problem list, current medications, allergies, past family history, past medical history, past social history and past surgical history.    Social History    Tobacco Use      Smoking status: Never Smoker      Smokeless " "tobacco: Never Used    Review of Systems  Constitutional POS: nothing reported    NEG: anorexia or night sweats   Genitourinary POS: nothing reported    NEG: dysuria or hematuria      Gastointestinal POS: constipation ?    NEG: bloating, change in bowel habits, melena or reflux symptoms   Integument POS: nothing reported    NEG: moles that are changing in size, shape, color or rashes   Breast POS: nothing reported    NEG: persistent breast lump, skin dimpling or nipple discharge        Objective   /66   Ht 180.3 cm (71\")   Breastfeeding No   BMI 35.65 kg/m²     General:  well developed; well nourished  no acute distress   Skin:  No suspicious lesions seen   Thyroid: normal to inspection and palpation   Breasts:  Examined in supine position  Symmetric without masses or skin dimpling  Nipples normal without inversion, lesions or discharge  There are no palpable axillary nodes   Abdomen: soft, non-tender; no masses  no umbilical or inguinal hernias are present  no hepato-splenomegaly   Pelvis: Clinical staff was present for exam  External genitalia:  normal appearance of the external genitalia including Bartholin's and [place]'s glands.  :  urethral meatus normal;  Vaginal:  normal pink mucosa without prolapse or lesions.  Cervix:  normal appearance.  Uterus:  normal size, shape and consistency.  Adnexa:  normal bimanual exam of the adnexa. tenderness of the left adnexa to  deep palpation;  Rectal:  digital rectal exam not performed; anus visually normal appearing.        Assessment   1. Normal GYN exam  2. History of abnormal pap with normal colposcopy last year   3. LLQ pain - new problem that needs additional work up     Plan   Pap was done today.  If she does not receive the results of the Pap within 2 weeks  time, she was instructed to call to find out the results.  I explained to Lita that because she is being seen in follow-up of a previously abnormal Pap smear, her next Pap needs to be performed in " 4-6 months.  She will need to be seen roughly every 6 months until 3 consecutive normal Paps have been obtained.  At that point, she can return to routine screening intervals.  The following tests were ordered today: UA with culture if indicated and UPT.  It was explained to Lita that all lab test should be back within the one week after they are performed. She will be notified about the results, regardless of the findings. If she has not been contacted by the office within 2 weeks after the test has been performed, it is her responsibility to contact us to learn about her results.  Pelvic ultrasound ordered to be done at her convenience.  No prescription was given or electronically sent at today's visit  The importance of keeping all planned follow-up and taking all medications as prescribed was emphasized.  Follow up for annual exam in one year    No orders of the defined types were placed in this encounter.         This note was electronically signed.    Kmaila Pinto MD  January 19, 2021    Note: Speech recognition transcription software may have been used to create portions of this document.  An attempt at proofreading has been made but errors in transcription could still be present.

## 2021-01-20 LAB
BILIRUB UR QL STRIP: NEGATIVE
CLARITY UR: CLEAR
COLOR UR: YELLOW
GLUCOSE UR STRIP-MCNC: NEGATIVE MG/DL
HGB UR QL STRIP.AUTO: NEGATIVE
KETONES UR QL STRIP: NEGATIVE
LEUKOCYTE ESTERASE UR QL STRIP.AUTO: NEGATIVE
NITRITE UR QL STRIP: NEGATIVE
PH UR STRIP.AUTO: 8 [PH] (ref 5–8)
PROT UR QL STRIP: NEGATIVE
SP GR UR STRIP: 1.01 (ref 1–1.03)
UROBILINOGEN UR QL STRIP: NORMAL

## 2021-01-22 ENCOUNTER — TELEPHONE (OUTPATIENT)
Dept: OBSTETRICS AND GYNECOLOGY | Facility: CLINIC | Age: 41
End: 2021-01-22

## 2021-02-02 RX ORDER — AMITRIPTYLINE HYDROCHLORIDE 25 MG/1
25 TABLET, FILM COATED ORAL NIGHTLY
Qty: 30 TABLET | Refills: 11 | Status: SHIPPED | OUTPATIENT
Start: 2021-02-02 | End: 2022-01-20 | Stop reason: SDUPTHER

## 2021-02-02 NOTE — TELEPHONE ENCOUNTER
Caller: Lita Varma    Relationship: Self    Best call back number: [phone]    Medication needed:   Requested Prescriptions     Pending Prescriptions Disp Refills   • amitriptyline (ELAVIL) 25 MG tablet 30 tablet 11     Sig: Take 1 tablet by mouth Every Night. For migraine prevention       When do you need the refill by: SOON    What details did the patient provide when requesting the medication: PATIENT HAS 3 DAYS OF MEDICATION REMAINING.  INSURANCE WANTS HER TO GET A 90 DAY SUPPLY INSTEAD OF ONE MONTH EACH TIME.    Does the patient have less than a 3 day supply:  [] Yes  [x] No    What is the patient's preferred pharmacy: [id number] [id number] [place] [address] - [phone] [facility] [phone] FX

## 2021-02-09 ENCOUNTER — OFFICE VISIT (OUTPATIENT)
Dept: OBSTETRICS AND GYNECOLOGY | Facility: CLINIC | Age: 41
End: 2021-02-09

## 2021-02-09 VITALS — HEIGHT: 71 IN | DIASTOLIC BLOOD PRESSURE: 70 MMHG | BODY MASS INDEX: 35.65 KG/M2 | SYSTOLIC BLOOD PRESSURE: 108 MMHG

## 2021-02-09 DIAGNOSIS — N83.202 LEFT OVARIAN CYST: Primary | ICD-10-CM

## 2021-02-09 DIAGNOSIS — Z97.5 IUD (INTRAUTERINE DEVICE) IN PLACE: ICD-10-CM

## 2021-02-09 PROCEDURE — 99213 OFFICE O/P EST LOW 20 MIN: CPT | Performed by: OBSTETRICS & GYNECOLOGY

## 2021-02-09 RX ORDER — CLOTRIMAZOLE 1 %
CREAM (GRAM) TOPICAL
COMMUNITY
Start: 2021-01-08 | End: 2021-12-13

## 2021-02-09 NOTE — PROGRESS NOTES
"Subjective   Chief Complaint   Patient presents with   • Follow-up     US today.      Lita Varma is a 40 y.o. year old  who comes to review her recent testing and discuss next steps.    OTHER THINGS SHE WANTS TO DISCUSS TODAY:  Nothing else       Objective   /70   Ht 180.3 cm (71\")   Breastfeeding No   BMI 35.65 kg/m²     Lab Review   No data reviewed    Imaging   Pelvic ultrasound report       Assessment   1. Left simple ovarian cyst - 4.1 x 3.5 x 2.3 cm  2. IUD in place     Plan   1. Reviewed ultrasound report with patient including the IUD is in correct place.  There is a simple benign appearing ovarian cyst on the left ovary.  Reviewed with patient unless her pain has persistent throughout the day I would recommend conservative follow-up to document stability or resolution of the cyst.  I did review the benign cyst can be seen with IUD use but this would not be a reason to have the IUD removed.  Patient verbalizes understanding and agrees to watch conservatively.  Reviewed pain precautions for patient for when to contact the office.  2. The importance of keeping all planned follow-up and taking all medications as prescribed was emphasized.  3. Follow up for recheck of above  with ultrasound in 6 weeks    No orders of the defined types were placed in this encounter.         Total time spent today with Lita  was 20-29 minutes (level 3).  Greater than 50% of the time was spent face-to-face coordinating care, answering her questions and counseling regarding pathophysiology of her presenting problem along with plans for any diagnositc work-up and treatment.    This note was electronically signed.    Kamila Pinto MD  2021    Note: Speech recognition transcription software may have been used to create portions of this document.  An attempt at proofreading has been made but errors in transcription could still be present.  "

## 2021-04-07 ENCOUNTER — OFFICE VISIT (OUTPATIENT)
Dept: OBSTETRICS AND GYNECOLOGY | Facility: CLINIC | Age: 41
End: 2021-04-07

## 2021-04-07 VITALS — BODY MASS INDEX: 35.65 KG/M2 | DIASTOLIC BLOOD PRESSURE: 62 MMHG | HEIGHT: 71 IN | SYSTOLIC BLOOD PRESSURE: 104 MMHG

## 2021-04-07 DIAGNOSIS — N83.202 LEFT OVARIAN CYST: ICD-10-CM

## 2021-04-07 DIAGNOSIS — Z97.5 IUD (INTRAUTERINE DEVICE) IN PLACE: Primary | ICD-10-CM

## 2021-04-07 PROCEDURE — 99212 OFFICE O/P EST SF 10 MIN: CPT | Performed by: OBSTETRICS & GYNECOLOGY

## 2021-04-07 NOTE — PROGRESS NOTES
"Subjective   Chief Complaint   Patient presents with   • Follow-up     US today.     Lita Varma is a 40 y.o. year old  who comes to review her recent testing and discuss next steps.  Reports she is overall been doing fine with no pain.  She is in the be moving to [place] for her job near [place] in about 2 weeks.  However she thinks she is going to stay with seeing me and her primary care provider for when she visits her sister here in town.    OTHER THINGS SHE WANTS TO DISCUSS TODAY:  Nothing else       Objective   /62   Ht 180.3 cm (71\")   Breastfeeding No   BMI 35.65 kg/m²     Lab Review   No data reviewed    Imaging   Pelvic ultrasound images independantly reviewed - IUD in correct fundal position.  No ovarian cyst seen.       Assessment   1. IUD (Mirena) in place  2. Resolved left ovarian cyst      Plan   1. Reviewed overall normal pelvic ultrasound with patient.  All questions answered.  2. The importance of keeping all planned follow-up and taking all medications as prescribed was emphasized.  3. Follow up for annual exam in one year    No orders of the defined types were placed in this encounter.         Total time spent today with Lita  was 10-19 minutes (level 2).  Greater than 50% of the time was spent face-to-face coordinating care, answering her questions and counseling regarding pathophysiology of her presenting problem along with plans for any diagnositc work-up and treatment.    This note was electronically signed.    Kamila Pinto MD  2021    Note: Speech recognition transcription software may have been used to create portions of this document.  An attempt at proofreading has been made but errors in transcription could still be present.  "

## 2021-11-19 ENCOUNTER — TELEMEDICINE (OUTPATIENT)
Dept: FAMILY MEDICINE CLINIC | Facility: CLINIC | Age: 41
End: 2021-11-19

## 2021-11-19 DIAGNOSIS — F41.9 ANXIETY: ICD-10-CM

## 2021-11-19 DIAGNOSIS — F41.0 PANIC ATTACKS: Primary | ICD-10-CM

## 2021-11-19 DIAGNOSIS — F33.1 MODERATE EPISODE OF RECURRENT MAJOR DEPRESSIVE DISORDER (HCC): ICD-10-CM

## 2021-11-19 DIAGNOSIS — G43.009 MIGRAINE WITHOUT AURA AND WITHOUT STATUS MIGRAINOSUS, NOT INTRACTABLE: ICD-10-CM

## 2021-11-19 PROCEDURE — 99214 OFFICE O/P EST MOD 30 MIN: CPT | Performed by: PHYSICIAN ASSISTANT

## 2021-11-19 RX ORDER — BUPROPION HYDROCHLORIDE 300 MG/1
300 TABLET ORAL DAILY
Qty: 90 TABLET | Refills: 3 | Status: SHIPPED | OUTPATIENT
Start: 2021-11-19 | End: 2022-08-16

## 2021-11-19 RX ORDER — TOPIRAMATE 25 MG/1
25 TABLET ORAL NIGHTLY
Qty: 30 TABLET | Refills: 1 | Status: SHIPPED | OUTPATIENT
Start: 2021-11-19 | End: 2021-12-02 | Stop reason: SDUPTHER

## 2021-11-19 RX ORDER — CLONAZEPAM 0.5 MG/1
0.5 TABLET ORAL DAILY PRN
Qty: 30 TABLET | Refills: 0 | Status: SHIPPED | OUTPATIENT
Start: 2021-11-19 | End: 2022-08-16

## 2021-11-19 RX ORDER — ESCITALOPRAM OXALATE 10 MG/1
10 TABLET ORAL EVERY MORNING
Qty: 30 TABLET | Refills: 2 | Status: SHIPPED | OUTPATIENT
Start: 2021-11-19 | End: 2021-12-02 | Stop reason: SDUPTHER

## 2021-11-19 NOTE — PROGRESS NOTES
Subjective   Lita Varma is a 41 y.o. female  Anxiety and Depression      History of Present Illness  Patient is a 41-year-old female.Patient presents and consents for telehealth/video visit examination.  Patient presents today for evaluation treatment of currently uncontrolled anxiety, depression and panic attacks.  She has been experiencing intermittent medical stressors related to work over the last 6 months having regular symptoms of depression and having symptoms of anxiety on a daily basis with panic symptoms situationally related to work.  She is also expensing more migraines usually 3 a week currently, Relpax does help it causes her significant nausea.  She is taking amitriptyline at night and Wellbutrin daily for depression.  Video visit 20 minutes in length  The following portions of the patient's history were reviewed and updated as appropriate: allergies, current medications, past social history and problem list    Review of Systems   Constitutional: Negative for appetite change, fatigue and unexpected weight change.   HENT: Negative for congestion, dental problem, postnasal drip, sinus pressure and sore throat.    Eyes: Positive for photophobia. Negative for pain and visual disturbance.   Respiratory: Negative for chest tightness and shortness of breath.    Cardiovascular: Negative.    Gastrointestinal: Positive for nausea. Negative for abdominal pain, diarrhea and vomiting.   Neurological: Positive for headaches. Negative for dizziness, tremors, syncope, facial asymmetry, speech difficulty, weakness, light-headedness and numbness.   Psychiatric/Behavioral: Positive for dysphoric mood. Negative for agitation, behavioral problems, confusion, decreased concentration, hallucinations, self-injury, sleep disturbance and suicidal ideas. The patient is nervous/anxious. The patient is not hyperactive.        Objective     There were no vitals filed for this visit.    Physical Exam  Constitutional:        General: She is not in acute distress.     Appearance: Normal appearance. She is well-developed. She is not ill-appearing, toxic-appearing or diaphoretic.   HENT:      Head: Normocephalic and atraumatic.   Eyes:      Conjunctiva/sclera: Conjunctivae normal.   Pulmonary:      Effort: Pulmonary effort is normal. No respiratory distress.   Skin:     General: Skin is dry.      Coloration: Skin is not pale.      Findings: No erythema or rash.   Neurological:      Mental Status: She is alert and oriented to person, place, and time.      Coordination: Coordination normal.   Psychiatric:         Attention and Perception: She is attentive.         Mood and Affect: Mood normal.         Speech: Speech normal.         Behavior: Behavior normal.         Thought Content: Thought content normal.         Judgment: Judgment normal.         Assessment/Plan     Diagnoses and all orders for this visit:    1. Panic attacks (Primary)  -     clonazePAM (KlonoPIN) 0.5 MG tablet; Take 1 tablet by mouth Daily As Needed for Anxiety (panic attack).  Dispense: 30 tablet; Refill: 0    2. Moderate episode of recurrent major depressive disorder (HCC)  -     buPROPion XL (Wellbutrin XL) 300 MG 24 hr tablet; Take 1 tablet by mouth Daily.  Dispense: 90 tablet; Refill: 3    3. Migraine without aura and without status migrainosus, not intractable    4. Anxiety    Other orders  -     escitalopram (Lexapro) 10 MG tablet; Take 1 tablet by mouth Every Morning. For anxiety  Dispense: 30 tablet; Refill: 2  -     topiramate (Topamax) 25 MG tablet; Take 1 tablet by mouth Every Night. For migraine prevention  Dispense: 30 tablet; Refill: 1  -     ubrogepant (ubrogepant) 100 MG tablet; Take 1 tablet by mouth 1 (One) Time As Needed (migraine) for up to 1 dose.  Dispense: 10 tablet; Refill: 11    Follow-up in 2 weeks for recheck.    Part of this note may be an electronic transcription/translation of spoken language to printed text using the Dragon Dictation  System.

## 2021-12-02 ENCOUNTER — TELEMEDICINE (OUTPATIENT)
Dept: FAMILY MEDICINE CLINIC | Facility: CLINIC | Age: 41
End: 2021-12-02

## 2021-12-02 DIAGNOSIS — F41.0 PANIC ATTACKS: ICD-10-CM

## 2021-12-02 DIAGNOSIS — F41.9 ANXIETY: ICD-10-CM

## 2021-12-02 DIAGNOSIS — G43.009 MIGRAINE WITHOUT AURA AND WITHOUT STATUS MIGRAINOSUS, NOT INTRACTABLE: Primary | ICD-10-CM

## 2021-12-02 DIAGNOSIS — F43.21 SITUATIONAL DEPRESSION: ICD-10-CM

## 2021-12-02 PROCEDURE — 99214 OFFICE O/P EST MOD 30 MIN: CPT | Performed by: PHYSICIAN ASSISTANT

## 2021-12-02 RX ORDER — TOPIRAMATE 25 MG/1
TABLET ORAL
Qty: 90 TABLET | Refills: 1 | Status: SHIPPED | OUTPATIENT
Start: 2021-12-02 | End: 2021-12-28 | Stop reason: SINTOL

## 2021-12-02 RX ORDER — ESCITALOPRAM OXALATE 20 MG/1
20 TABLET ORAL EVERY MORNING
Qty: 30 TABLET | Refills: 3 | Status: SHIPPED | OUTPATIENT
Start: 2021-12-02 | End: 2021-12-13 | Stop reason: ALTCHOICE

## 2021-12-02 NOTE — PROGRESS NOTES
Subjective   Lita Varma is a 41 y.o. female  Med Refill      History of Present Illness  Patient is a 41-year-old female.Patient presents and consents for telehealth/video visit examination.  Patient presents today for follow-up on currently uncontrolled depression, anxiety with panic attacks and migraine.  Please see previous office notes from November 19, 2021.  Medication changes were made at last appointment.  Unfortunately, patient states she is not seeing much improvement.  She was feeling initially that she had fewer migraines but had a severe migraine yesterday, currently taking Topamax 25 mg at nighttime along with amitriptyline nightly for migraine prevention.  She was unable to get her insurance to cover Ubrelvy did take Relpax yesterday which did help although it  gives her nausea.  Feels persistent  migraine fading today.  She has ongoing persistent anxiety throughout the day continue to struggle with daily depression as well, no homicidal or suicidal ideations.  Has increased panic when considering stressful interactions at work.  Denies any adverse effects from current medication she is taking.  Video visit 23-minute in length.  The following portions of the patient's history were reviewed and updated as appropriate: allergies, current medications, past social history and problem list    Review of Systems   Constitutional: Negative for appetite change, fatigue and unexpected weight change.   HENT: Negative for congestion, dental problem, postnasal drip, sinus pressure and sore throat.    Eyes: Positive for photophobia. Negative for pain and visual disturbance.   Respiratory: Negative for chest tightness and shortness of breath.    Gastrointestinal: Negative for abdominal pain, diarrhea, nausea and vomiting.   Musculoskeletal: Positive for neck pain.   Neurological: Positive for headaches. Negative for dizziness, tremors, syncope, facial asymmetry, speech difficulty, weakness, light-headedness  and numbness.   Psychiatric/Behavioral: Positive for dysphoric mood and sleep disturbance. Negative for agitation, behavioral problems, confusion, decreased concentration, hallucinations, self-injury and suicidal ideas. The patient is nervous/anxious. The patient is not hyperactive.        Objective     There were no vitals filed for this visit.    Physical Exam  Constitutional:       General: She is not in acute distress.     Appearance: Normal appearance. She is well-developed. She is not ill-appearing, toxic-appearing or diaphoretic.   HENT:      Head: Normocephalic and atraumatic.   Eyes:      Conjunctiva/sclera: Conjunctivae normal.   Neck:      Thyroid: No thyroid mass or thyromegaly.   Pulmonary:      Effort: Pulmonary effort is normal. No respiratory distress.   Skin:     General: Skin is dry.   Neurological:      Mental Status: She is alert and oriented to person, place, and time.   Psychiatric:         Attention and Perception: Attention and perception normal. She is attentive.         Mood and Affect: Mood is anxious and depressed. Affect is not angry or inappropriate.         Speech: Speech normal.         Behavior: Behavior normal.         Thought Content: Thought content normal.         Cognition and Memory: Cognition normal.         Judgment: Judgment normal.         Assessment/Plan     Diagnoses and all orders for this visit:    1. Migraine without aura and without status migrainosus, not intractable (Primary)    2. Situational depression    3. Panic attacks    4. Anxiety    Other orders  -     topiramate (Topamax) 25 MG tablet; Take one each morning and 2 each evening, new dose For migraine prevention  Dispense: 90 tablet; Refill: 1  -     escitalopram (Lexapro) 20 MG tablet; Take 1 tablet by mouth Every Morning. New dose, For anxiety  Dispense: 30 tablet; Refill: 3    Increase Topamax to 50 mg at night 25 mg in the morning, continue amitriptyline 25 g nightly for migraine prevention, continue  taking Robaxin migraine occurs.  Continue on Wellbutrin at current dosage, increase Lexapro to 20 mg daily, continue taking Klonopin at current dosage as needed.  It is my medical recommendation that patient be put off work for the next 4 weeks to allow for medication management to stabilize her symptoms that are ongoing, uncontrolled related to all 4 above-noted medical conditions.  She will follow-up with me in 3 weeks for recheck sooner if symptoms worsen.    Part of this note may be an electronic transcription/translation of spoken language to printed text using the Dragon Dictation System.

## 2021-12-13 ENCOUNTER — OFFICE VISIT (OUTPATIENT)
Dept: FAMILY MEDICINE CLINIC | Facility: CLINIC | Age: 41
End: 2021-12-13

## 2021-12-13 VITALS
HEART RATE: 106 BPM | BODY MASS INDEX: 41.02 KG/M2 | HEIGHT: 71 IN | OXYGEN SATURATION: 98 % | SYSTOLIC BLOOD PRESSURE: 120 MMHG | DIASTOLIC BLOOD PRESSURE: 70 MMHG | WEIGHT: 293 LBS | TEMPERATURE: 97.2 F

## 2021-12-13 DIAGNOSIS — F41.9 ANXIETY: ICD-10-CM

## 2021-12-13 DIAGNOSIS — G43.009 MIGRAINE WITHOUT AURA AND WITHOUT STATUS MIGRAINOSUS, NOT INTRACTABLE: Primary | ICD-10-CM

## 2021-12-13 DIAGNOSIS — F43.21 SITUATIONAL DEPRESSION: ICD-10-CM

## 2021-12-13 DIAGNOSIS — F41.0 PANIC ATTACKS: ICD-10-CM

## 2021-12-13 PROCEDURE — 99214 OFFICE O/P EST MOD 30 MIN: CPT | Performed by: PHYSICIAN ASSISTANT

## 2021-12-13 RX ORDER — DULOXETIN HYDROCHLORIDE 60 MG/1
60 CAPSULE, DELAYED RELEASE ORAL NIGHTLY
Qty: 30 CAPSULE | Refills: 2 | Status: SHIPPED | OUTPATIENT
Start: 2021-12-13 | End: 2022-01-14 | Stop reason: SDUPTHER

## 2021-12-13 NOTE — PROGRESS NOTES
"Subjective   Lita Varma is a 41 y.o. female  Migraine (Follow up on migraines for FMLA and Short term disability) and Anxiety (follow up on anxiety for FMLA and short term disability)      History of Present Illness     Patient is a 41-year-old female seen today for follow-up on anxiety and migraines. She reports her migraines have improved since last visit. The patient is taking Topamax. For her anxiety, she states that she feels most days she is \"moving\" with a lot of anxiety. The patient does not believe her escitalopram she is taking q.a.m. is helping at all at the new dose. She reports taking clonzepam p.r.n for panic attacks without any side effects. The patient states that she has tried getting back into the gym to work out, but it has caused her more anxiety. She is currently taking her bupropion as prescribed. The patient is needing FMLA an disability forms filled out today.    The following portions of the patient's history were reviewed and updated as appropriate: allergies, current medications, past social history and problem list    Review of Systems   Constitutional: Negative for appetite change, fatigue and unexpected weight change.   HENT: Negative for congestion, dental problem, postnasal drip, sinus pressure and sore throat.    Eyes: Negative for photophobia, pain and visual disturbance.   Respiratory: Negative for chest tightness and shortness of breath.    Gastrointestinal: Negative for abdominal pain, diarrhea, nausea and vomiting.   Neurological: Positive for headaches. Negative for dizziness, tremors, syncope, facial asymmetry, speech difficulty, weakness, light-headedness and numbness.   Psychiatric/Behavioral: Positive for dysphoric mood. Negative for agitation, behavioral problems, confusion, decreased concentration and suicidal ideas. The patient is nervous/anxious.        Objective     Vitals:    12/13/21 1453   BP: 120/70   Pulse: 106   Temp: 97.2 °F (36.2 °C)   SpO2: 98% "       Physical Exam  Vitals and nursing note reviewed.   Constitutional:       General: She is not in acute distress.     Appearance: Normal appearance. She is well-developed. She is not ill-appearing, toxic-appearing or diaphoretic.   HENT:      Head: Normocephalic and atraumatic.   Eyes:      Conjunctiva/sclera: Conjunctivae normal.      Pupils: Pupils are equal, round, and reactive to light.   Neck:      Thyroid: No thyroid mass or thyromegaly.   Cardiovascular:      Rate and Rhythm: Normal rate and regular rhythm.      Heart sounds: Normal heart sounds.   Pulmonary:      Effort: Pulmonary effort is normal. No respiratory distress.   Musculoskeletal:      Cervical back: Normal range of motion and neck supple. No rigidity.   Skin:     General: Skin is warm and dry.   Neurological:      Mental Status: She is alert and oriented to person, place, and time.      Cranial Nerves: No cranial nerve deficit.      Sensory: No sensory deficit.      Motor: No weakness.      Coordination: Coordination normal.   Psychiatric:         Attention and Perception: Attention and perception normal. She is attentive.         Mood and Affect: Mood is anxious. Mood is not depressed. Affect is not angry or inappropriate.         Speech: Speech normal.         Behavior: Behavior normal.         Thought Content: Thought content normal. Thought content does not include homicidal or suicidal ideation.         Cognition and Memory: Cognition normal.         Judgment: Judgment normal.         Assessment/Plan     Diagnoses and all orders for this visit:    1. Migraine without aura and without status migrainosus, not intractable (Primary)    2. Panic attacks    3. Situational depression    4. Anxiety    Other orders  -     DULoxetine (Cymbalta) 60 MG capsule; Take 1 capsule by mouth Every Night. For anxiety and depression  Dispense: 30 capsule; Refill: 2     She is continuing to have migraines approximately once a week, but they have improved with  regards to the intensity. She will continue on current medications for migraines. We will refer to neurology.  The patient is continuing to have daily anxiety as noted. Will refer to behavioral health specialist.  No adverse effects on medications, but continues to be symptomatic daily. FMLA forms and [employer] disability forms filled out today. The patient will be seen back in 2 weeks and plan on keeping patient off work through 01/17/2022.  Medications adjusted today. The patient is to discontinue Lexapro and will switch to Cymbalta. She will return for reevaluation in 4 to 6 weeks.  Amount of face-to-face time spent in this visit was 40 minutes.    Transcribed from ambient dictation for DANIELLE Zaldivar by Selena Luna.

## 2021-12-28 ENCOUNTER — OFFICE VISIT (OUTPATIENT)
Dept: FAMILY MEDICINE CLINIC | Facility: CLINIC | Age: 41
End: 2021-12-28

## 2021-12-28 VITALS
BODY MASS INDEX: 41.84 KG/M2 | OXYGEN SATURATION: 96 % | HEART RATE: 108 BPM | TEMPERATURE: 96.9 F | HEIGHT: 71 IN | DIASTOLIC BLOOD PRESSURE: 82 MMHG | RESPIRATION RATE: 18 BRPM | SYSTOLIC BLOOD PRESSURE: 126 MMHG

## 2021-12-28 DIAGNOSIS — G43.009 MIGRAINE WITHOUT AURA AND WITHOUT STATUS MIGRAINOSUS, NOT INTRACTABLE: Primary | ICD-10-CM

## 2021-12-28 DIAGNOSIS — G25.81 RESTLESS LEG SYNDROME: ICD-10-CM

## 2021-12-28 DIAGNOSIS — F41.9 ANXIETY: ICD-10-CM

## 2021-12-28 DIAGNOSIS — F41.0 PANIC ATTACKS: ICD-10-CM

## 2021-12-28 DIAGNOSIS — F43.21 SITUATIONAL DEPRESSION: ICD-10-CM

## 2021-12-28 PROCEDURE — 99213 OFFICE O/P EST LOW 20 MIN: CPT | Performed by: PHYSICIAN ASSISTANT

## 2021-12-28 RX ORDER — GABAPENTIN 300 MG/1
CAPSULE ORAL
Qty: 60 CAPSULE | Refills: 0 | Status: SHIPPED | OUTPATIENT
Start: 2021-12-28 | End: 2022-01-20 | Stop reason: SDUPTHER

## 2021-12-28 NOTE — PROGRESS NOTES
Subjective   Lita Varma is a 41 y.o. female  Situational depression, Anxiety, and Migraine    The patient consents to recording with [facility].    History of Present Illness    Ms. Varma presents today for follow-up on migraine headaches, panic attacks, situational depression, and anxiety. Please see previous office notes from 12/13/2021. She is currently off work on medical leave through 01/17/2022. Paperwork was filled out at last visit. Medications were adjusted at her last appointment.    She reports the anxiety is lessened but not completely resolved. The depression continues to be present as well. She initially had improvement with the duloxetine during the 1st week of use, but she experienced a plateau during the 2nd week of use. She reports increased fatigue. She reports wanting to take a nap an hour after waking. The patient tested negative for sleep apnea during adolescence. She does have restless leg syndrome and does not take anything for this currently. She is taking iron supplement.    Regarding migraines, the patient stopped taking the Topamax due to side effects of dizziness, dry mouth, as well as disorientation when lying in bed. She was talking 1 tablet of Topamax in the morning and 2 tablets at night. She confirms drinking sufficient water. Since stopping the Topamax, the side effects have resolved. She has not had any severe migraines, but she has had increased headaches since then. She continues to take amitriptyline.    The following portions of the patient's history were reviewed and updated as appropriate: allergies, current medications, past social history and problem list    Review of Systems   Constitutional: Positive for fatigue. Negative for appetite change and unexpected weight change.   HENT: Negative for congestion, dental problem, postnasal drip, sinus pressure and sore throat.    Eyes: Negative for photophobia, pain and visual disturbance.   Respiratory: Negative for chest tightness  and shortness of breath.    Gastrointestinal: Negative for abdominal pain, diarrhea, nausea and vomiting.   Neurological: Positive for headaches. Negative for dizziness, tremors, syncope, facial asymmetry, speech difficulty, weakness, light-headedness and numbness.        Positive for restless legs   Psychiatric/Behavioral: Positive for dysphoric mood and sleep disturbance. Negative for agitation, behavioral problems, confusion, decreased concentration and suicidal ideas. The patient is nervous/anxious.        Objective     Vitals:    12/28/21 1454   BP: 126/82   Pulse: 108   Resp: 18   Temp: 96.9 °F (36.1 °C)   SpO2: 96%       Physical Exam  Vitals and nursing note reviewed.   Constitutional:       General: She is not in acute distress.     Appearance: Normal appearance. She is well-developed. She is not ill-appearing, toxic-appearing or diaphoretic.   HENT:      Head: Normocephalic and atraumatic.   Eyes:      Conjunctiva/sclera: Conjunctivae normal.      Pupils: Pupils are equal, round, and reactive to light.   Neck:      Thyroid: No thyroid mass or thyromegaly.   Cardiovascular:      Rate and Rhythm: Normal rate and regular rhythm.      Heart sounds: Normal heart sounds.   Pulmonary:      Effort: Pulmonary effort is normal. No respiratory distress.   Musculoskeletal:      Cervical back: Normal range of motion and neck supple. No rigidity.   Skin:     General: Skin is warm and dry.   Neurological:      Mental Status: She is alert and oriented to person, place, and time.      Cranial Nerves: No cranial nerve deficit.      Sensory: No sensory deficit.      Motor: No weakness.      Coordination: Coordination normal.   Psychiatric:         Attention and Perception: Attention and perception normal. She is attentive.         Mood and Affect: Mood is anxious. Mood is not depressed. Affect is not angry or inappropriate.         Speech: Speech normal.         Behavior: Behavior normal.         Thought Content: Thought  content normal.         Cognition and Memory: Cognition normal.         Judgment: Judgment normal.         Assessment/Plan     Diagnoses and all orders for this visit:    1. Migraine without aura and without status migrainosus, not intractable (Primary)    2. Panic attacks    3. Anxiety    4. Situational depression     Regarding the continued anxiety and depression, she will continue the current dose of duloxetine and Wellbutrin. She can take Clonazepam if needed for panic attack. Regarding the migraines, she is to continue amitriptyline. She will discontinue the Topamax due to the side effect of dry mouth. Regarding her chronic fatigue, the restless leg syndrome could be disrupting her sleep. We will prescribe gabapentin 300 mg 1 tablet at bedtime. If after 3 nights this is not helping, she can increased the dose to 2 tablets. She can continue her iron supplement. If her energy does not improve within 2 weeks on gabapentin, we will send her for a sleep study to rule out sleep apnea. The gabapentin may help with her anxiety and headache as well. She is scheduled to be off of work through 01/17/2022. The patient will follow up on 01/14/2022 for re-evaluation and medication check.    Transcribed from ambient dictation for Lana Sotelo PA-C by Caroline Staley.  12/28/21   19:08 EST    Patient verbalized consent to the visit recording.

## 2022-01-10 ENCOUNTER — TELEPHONE (OUTPATIENT)
Dept: FAMILY MEDICINE CLINIC | Facility: CLINIC | Age: 42
End: 2022-01-10

## 2022-01-14 ENCOUNTER — TELEPHONE (OUTPATIENT)
Dept: FAMILY MEDICINE CLINIC | Facility: CLINIC | Age: 42
End: 2022-01-14

## 2022-01-14 RX ORDER — DULOXETIN HYDROCHLORIDE 60 MG/1
60 CAPSULE, DELAYED RELEASE ORAL NIGHTLY
Qty: 30 CAPSULE | Refills: 0 | Status: SHIPPED | OUTPATIENT
Start: 2022-01-14 | End: 2022-01-17 | Stop reason: SDUPTHER

## 2022-01-17 ENCOUNTER — TELEPHONE (OUTPATIENT)
Dept: FAMILY MEDICINE CLINIC | Facility: CLINIC | Age: 42
End: 2022-01-17

## 2022-01-17 RX ORDER — DULOXETIN HYDROCHLORIDE 60 MG/1
60 CAPSULE, DELAYED RELEASE ORAL NIGHTLY
Qty: 90 CAPSULE | Refills: 0 | Status: SHIPPED | OUTPATIENT
Start: 2022-01-17 | End: 2022-08-16

## 2022-01-17 NOTE — TELEPHONE ENCOUNTER
Caller: Lita Varma    Relationship to patient: Self    Best call back number:[phone]    Patient is needing: PATIENT STATED DULoxetine (Cymbalta) 60 MG capsule MEDICATION WOULD NEED A 90 DAY SUPPLY NOT A 30 DAY SUPPLY BECAUSE OF OUT OF POCKET EXPENSES     PLEASE ADVISE

## 2022-01-20 ENCOUNTER — OFFICE VISIT (OUTPATIENT)
Dept: FAMILY MEDICINE CLINIC | Facility: CLINIC | Age: 42
End: 2022-01-20

## 2022-01-20 VITALS
HEIGHT: 71 IN | DIASTOLIC BLOOD PRESSURE: 78 MMHG | SYSTOLIC BLOOD PRESSURE: 128 MMHG | HEART RATE: 101 BPM | OXYGEN SATURATION: 98 % | RESPIRATION RATE: 16 BRPM | BODY MASS INDEX: 41.86 KG/M2 | TEMPERATURE: 97.2 F

## 2022-01-20 DIAGNOSIS — G25.81 RESTLESS LEG SYNDROME: ICD-10-CM

## 2022-01-20 DIAGNOSIS — G43.009 MIGRAINE WITHOUT AURA AND WITHOUT STATUS MIGRAINOSUS, NOT INTRACTABLE: Primary | ICD-10-CM

## 2022-01-20 PROCEDURE — 99213 OFFICE O/P EST LOW 20 MIN: CPT | Performed by: PHYSICIAN ASSISTANT

## 2022-01-20 RX ORDER — AMITRIPTYLINE HYDROCHLORIDE 25 MG/1
25 TABLET, FILM COATED ORAL NIGHTLY
Qty: 30 TABLET | Refills: 11 | Status: SHIPPED | OUTPATIENT
Start: 2022-01-20 | End: 2022-01-24 | Stop reason: SDUPTHER

## 2022-01-20 RX ORDER — GABAPENTIN 300 MG/1
CAPSULE ORAL
Qty: 180 CAPSULE | Refills: 1 | Status: SHIPPED | OUTPATIENT
Start: 2022-01-20 | End: 2022-08-16 | Stop reason: SDUPTHER

## 2022-01-20 NOTE — PROGRESS NOTES
Subjective   Lita Vamra is a 41 y.o. female  Migraine (RF amitriptyline #90) and Restless Legs Syndrome (RF gabapentin 300mg-taking 2qhs. Req 90-day if possible )      History of Present Illness  Patient is a pleasant 41-year-old white female who presents for migraine headaches needs refill amitriptyline also restless leg syndrome needs refill of gabapentin meds working well both chronic conditions stable no SI/HI headaches are controlled  The following portions of the patient's history were reviewed and updated as appropriate: allergies, current medications, past social history and problem list    Review of Systems   Constitutional: Negative for appetite change, diaphoresis, fatigue and unexpected weight change.   Eyes: Negative for visual disturbance.   Respiratory: Negative for cough, chest tightness and shortness of breath.    Cardiovascular: Negative for chest pain, palpitations and leg swelling.   Gastrointestinal: Negative for diarrhea, nausea and vomiting.   Endocrine: Negative for polydipsia, polyphagia and polyuria.   Skin: Negative for color change and rash.   Neurological: Negative for dizziness, syncope, weakness, light-headedness, numbness and headaches.       Objective     Vitals:    01/20/22 1128   BP: 128/78   Pulse: 101   Resp: 16   Temp: 97.2 °F (36.2 °C)   SpO2: 98%       Physical Exam  Vitals and nursing note reviewed.   Constitutional:       General: She is not in acute distress.     Appearance: Normal appearance. She is well-developed. She is not ill-appearing, toxic-appearing or diaphoretic.   Neck:      Thyroid: No thyromegaly.      Vascular: No carotid bruit or JVD.   Cardiovascular:      Rate and Rhythm: Normal rate and regular rhythm.      Pulses: Normal pulses.      Heart sounds: Normal heart sounds. No murmur heard.      Pulmonary:      Effort: Pulmonary effort is normal. No respiratory distress.      Breath sounds: Normal breath sounds.   Abdominal:      Palpations: Abdomen is  soft. There is no mass.      Tenderness: There is no abdominal tenderness.   Musculoskeletal:      Cervical back: Neck supple.   Lymphadenopathy:      Cervical: No cervical adenopathy.   Skin:     General: Skin is warm and dry.   Neurological:      Mental Status: She is alert.      Sensory: No sensory deficit.         Assessment/Plan     Diagnoses and all orders for this visit:    1. Migraine without aura and without status migrainosus, not intractable (Primary)  -     amitriptyline (ELAVIL) 25 MG tablet; Take 1 tablet by mouth Every Night. For migraine prevention  Dispense: 30 tablet; Refill: 11    2. Restless leg syndrome  -     gabapentin (NEURONTIN) 300 MG capsule; Take 1-2 nightly at bedtime for restless leg syndrome  Dispense: 180 capsule; Refill: 1     I spent 15 minutes in patient care: Reviewing records prior to the visit, examining the patient, entering orders and documentation    Part of this note may be an electronic transcription/translation of spoken language to printed text using the Dragon Dictation System.

## 2022-01-24 DIAGNOSIS — G43.009 MIGRAINE WITHOUT AURA AND WITHOUT STATUS MIGRAINOSUS, NOT INTRACTABLE: ICD-10-CM

## 2022-01-24 NOTE — TELEPHONE ENCOUNTER
Caller: Lita Varma    Relationship: Self    Best call back number: [phone]    Requested Prescriptions:   Requested Prescriptions     Pending Prescriptions Disp Refills   • amitriptyline (ELAVIL) 25 MG tablet 30 tablet 11     Sig: Take 1 tablet by mouth Every Night. For migraine prevention        Pharmacy where request should be sent:   [facility] #[id number] [address] [facility] [address] [place] - [phone] [facility] [phone]     Additional details provided by patient: PATIENT IS COMPLETLEY OUT AND IS REQUESTING A NEW PRESCRIPTION BE CALLED IN WITH A 90 DAY SUPPLY     Does the patient have less than a 3 day supply:  [x] Yes  [] No    Scarlet Garrison, [place] Rep   01/24/22 11:53 EST

## 2022-01-25 RX ORDER — AMITRIPTYLINE HYDROCHLORIDE 25 MG/1
25 TABLET, FILM COATED ORAL NIGHTLY
Qty: 90 TABLET | Refills: 3 | Status: SHIPPED | OUTPATIENT
Start: 2022-01-25 | End: 2023-01-04

## 2022-01-25 NOTE — TELEPHONE ENCOUNTER
Rx Refill Note  Requested Prescriptions     Pending Prescriptions Disp Refills   • amitriptyline (ELAVIL) 25 MG tablet 90 tablet 3     Sig: Take 1 tablet by mouth Every Night. For migraine prevention      Last office visit with prescribing clinician: 12/28/2021      Next office visit with prescribing clinician: Visit date not found            STEFANI PEDERSON MA  01/25/22, 08:21 EST

## 2022-08-02 DIAGNOSIS — G25.81 RESTLESS LEG SYNDROME: ICD-10-CM

## 2022-08-02 RX ORDER — GABAPENTIN 300 MG/1
CAPSULE ORAL
Qty: 180 CAPSULE | Refills: 0 | OUTPATIENT
Start: 2022-08-02

## 2022-08-04 ENCOUNTER — TELEPHONE (OUTPATIENT)
Dept: FAMILY MEDICINE CLINIC | Facility: CLINIC | Age: 42
End: 2022-08-04

## 2022-08-04 DIAGNOSIS — G25.81 RESTLESS LEG SYNDROME: ICD-10-CM

## 2022-08-04 RX ORDER — GABAPENTIN 300 MG/1
CAPSULE ORAL
Qty: 180 CAPSULE | Refills: 1 | Status: CANCELLED | OUTPATIENT
Start: 2022-08-04

## 2022-08-04 NOTE — TELEPHONE ENCOUNTER
Caller: Lita Varma    Relationship: Self    Best call back number: [phone]    Requested Prescriptions:   Requested Prescriptions     Pending Prescriptions Disp Refills   • gabapentin (NEURONTIN) 300 MG capsule 180 capsule 1     Sig: Take 1-2 nightly at bedtime for restless leg syndrome        Pharmacy where request should be sent:      [id number] - [facility] [address] AT [facility] [address] [place] - [phone]  - [phone] [place]     Additional details provided by patient: PATIENT WOULD RATHER NOT BE SEEN BECAUSE SHE DOES NOT HAVE INSURANCE AT THIS TIME    Does the patient have less than a 3 day supply:  [x] Yes  [] No    [place]annah Mendez Rep   08/04/22 11:21 EDT

## 2022-08-04 NOTE — TELEPHONE ENCOUNTER
Additional details provided by patient: PATIENT WOULD RATHER NOT BE SEEN BECAUSE SHE DOES NOT HAVE INSURANCE AT THIS TIME

## 2022-08-16 ENCOUNTER — OFFICE VISIT (OUTPATIENT)
Dept: FAMILY MEDICINE CLINIC | Facility: CLINIC | Age: 42
End: 2022-08-16

## 2022-08-16 VITALS
HEART RATE: 98 BPM | TEMPERATURE: 97.2 F | SYSTOLIC BLOOD PRESSURE: 132 MMHG | OXYGEN SATURATION: 99 % | DIASTOLIC BLOOD PRESSURE: 80 MMHG | HEIGHT: 71 IN | BODY MASS INDEX: 41.84 KG/M2

## 2022-08-16 DIAGNOSIS — G43.009 MIGRAINE WITHOUT AURA AND WITHOUT STATUS MIGRAINOSUS, NOT INTRACTABLE: ICD-10-CM

## 2022-08-16 DIAGNOSIS — G25.81 RESTLESS LEG SYNDROME: Primary | ICD-10-CM

## 2022-08-16 PROCEDURE — 99213 OFFICE O/P EST LOW 20 MIN: CPT | Performed by: PHYSICIAN ASSISTANT

## 2022-08-16 RX ORDER — GABAPENTIN 300 MG/1
CAPSULE ORAL
Qty: 180 CAPSULE | Refills: 1 | Status: SHIPPED | OUTPATIENT
Start: 2022-08-16

## 2022-08-16 NOTE — PROGRESS NOTES
Subjective   Lita Varma is a 41 y.o. female  Restless Legs Syndrome (Follow up on RLS, refill on Gabapentin )      History of Present Illness  The patient presents today to follow-up on restless leg syndrome. She needs a refill on gabapentin. The patient reports that she is doing well and notes that taking gabapentin helps significantly with restless leg syndrome. She notes that she intermittently experiences minimal swelling in her legs and hands. She reports the swelling occurs during walks and eventually goes away. The patient reports that she rests better at night while taking gabapentin, and she reports taking 2 every evening.     The patient states she has discontinued taking Wellbutrin and she does not wish to take it in the future. She confirms that she is still taking amitriptyline, and she reports that if she misses taking it for 1 night it triggers a migraine the next morning. The patient reports consciously keeping track of using ibuprofen. The patient states that she does not need a refill on eletriptan to prevent migraines because she has some left at home.     The patient notes concern with intermittent pain in her right ear secondary to clenching. 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 She reports that she does not have insurance right now and she is utilizing [facility] for refills on prescriptions.       The following portions of the patient's history were reviewed and updated as appropriate: allergies, current medications, past social history and problem list    Review of Systems   Constitutional: Negative for fatigue and unexpected weight change.   Neurological: Positive for headaches ( stable improved). Negative for tremors, weakness and light-headedness.   Psychiatric/Behavioral: Positive for sleep disturbance ( stable improved). Negative for agitation, behavioral problems, confusion, decreased concentration, dysphoric mood and hallucinations. The  patient is not nervous/anxious and is not hyperactive.        Objective     Vitals:    08/16/22 1626   BP: 132/80   Pulse: 98   Temp: 97.2 °F (36.2 °C)   SpO2: 99%       Physical Exam  Vitals and nursing note reviewed.   Constitutional:       General: She is not in acute distress.     Appearance: Normal appearance. She is well-developed. She is not ill-appearing, toxic-appearing or diaphoretic.   Eyes:      Conjunctiva/sclera: Conjunctivae normal.   Cardiovascular:      Rate and Rhythm: Normal rate and regular rhythm.   Pulmonary:      Effort: Pulmonary effort is normal.      Breath sounds: Normal breath sounds.   Neurological:      Mental Status: She is alert and oriented to person, place, and time.      Coordination: Coordination normal.   Psychiatric:         Attention and Perception: She is attentive.         Mood and Affect: Mood normal.         Behavior: Behavior normal.         Thought Content: Thought content normal.         Judgment: Judgment normal.         Assessment & Plan     Diagnoses and all orders for this visit:    1. Restless leg syndrome (Primary)  -     gabapentin (NEURONTIN) 300 MG capsule; Take 1-2 nightly at bedtime for restless leg syndrome  Dispense: 180 capsule; Refill: 1    2. Migraine without aura and without status migrainosus, not intractable      Restless leg syndrome  The patient is doing well on her current dose of gabapentin. A refill was sent to her preferred pharmacy today. She will follow up in 6 months.         As part of this patient's treatment plan, patient will be prescribed controlled substances. The patient has been made aware of appropriate use of such medications, including potential risk of somnolence, limited ability to drive and /or work safely, and potential for dependence or overdose. It has also been made clear that these medications are for use by this patient only, without concomitant use of alcohol or other substances unless prescribed.Controlled substance  status of medication discussed with patient, discussed risks of medication including abuse potential and diversion potential and need to follow up for reevaluation appointment in order to receive further refills.    Transcribed from ambient dictation for Lana Sotleo PA-C by Elana Powers.  08/16/22   18:21 EDT    Patient verbalized consent to the visit recording.  I have personally performed the services described in this document as transcribed by the above individual, and it is both accurate and complete.  Lana Sotelo PA-C  8/17/2022  11:36 EDT      Part of this note may be an electronic transcription/translation of spoken language to printed text using the Dragon Dictation System.

## 2023-01-03 DIAGNOSIS — G43.009 MIGRAINE WITHOUT AURA AND WITHOUT STATUS MIGRAINOSUS, NOT INTRACTABLE: ICD-10-CM

## 2023-01-04 RX ORDER — AMITRIPTYLINE HYDROCHLORIDE 25 MG/1
TABLET, FILM COATED ORAL
Qty: 90 TABLET | Refills: 0 | Status: SHIPPED | OUTPATIENT
Start: 2023-01-04

## 2023-02-28 NOTE — TELEPHONE ENCOUNTER
----- Message from Yuki Bobebs sent at 3/9/2017  4:14 PM EST -----  Contact: [phone]  Returned your call, please call her back    [phone] spoke with patient and advised her her pap smear was non diagnostic and her insurance will not be billed and she can either come back for a repeat at her convenience or we can just repeat it at her next appointment. Patient states she will just wait and repeat her pap at her next visit  
Yes

## 2023-04-14 DIAGNOSIS — G43.009 MIGRAINE WITHOUT AURA AND WITHOUT STATUS MIGRAINOSUS, NOT INTRACTABLE: ICD-10-CM

## 2023-04-20 ENCOUNTER — OFFICE VISIT (OUTPATIENT)
Dept: FAMILY MEDICINE CLINIC | Facility: CLINIC | Age: 43
End: 2023-04-20

## 2023-04-20 VITALS
SYSTOLIC BLOOD PRESSURE: 124 MMHG | OXYGEN SATURATION: 98 % | HEIGHT: 71 IN | BODY MASS INDEX: 41.84 KG/M2 | HEART RATE: 79 BPM | TEMPERATURE: 97.5 F | DIASTOLIC BLOOD PRESSURE: 66 MMHG

## 2023-04-20 DIAGNOSIS — G25.81 RESTLESS LEG SYNDROME: Primary | ICD-10-CM

## 2023-04-20 DIAGNOSIS — J40 BRONCHITIS: ICD-10-CM

## 2023-04-20 DIAGNOSIS — Z51.81 ENCOUNTER FOR THERAPEUTIC DRUG MONITORING: Primary | ICD-10-CM

## 2023-04-20 RX ORDER — ALBUTEROL SULFATE 90 UG/1
2 AEROSOL, METERED RESPIRATORY (INHALATION) EVERY 4 HOURS PRN
Qty: 6.7 G | Refills: 1 | Status: SHIPPED | OUTPATIENT
Start: 2023-04-20

## 2023-04-20 RX ORDER — AMITRIPTYLINE HYDROCHLORIDE 25 MG/1
TABLET, FILM COATED ORAL
Qty: 90 TABLET | Refills: 3 | Status: SHIPPED | OUTPATIENT
Start: 2023-04-20

## 2023-04-20 RX ORDER — BENZONATATE 200 MG/1
200 CAPSULE ORAL 3 TIMES DAILY PRN
Qty: 21 CAPSULE | Refills: 1 | Status: SHIPPED | OUTPATIENT
Start: 2023-04-20

## 2023-04-20 RX ORDER — GABAPENTIN 300 MG/1
CAPSULE ORAL
Qty: 180 CAPSULE | Refills: 1 | Status: SHIPPED | OUTPATIENT
Start: 2023-04-20

## 2023-04-20 RX ORDER — ELETRIPTAN HYDROBROMIDE 40 MG/1
40 TABLET, FILM COATED ORAL ONCE AS NEEDED
Qty: 10 TABLET | Refills: 11 | Status: SHIPPED | OUTPATIENT
Start: 2023-04-20

## 2023-04-20 RX ORDER — AZITHROMYCIN 250 MG/1
TABLET, FILM COATED ORAL
Qty: 6 TABLET | Refills: 0 | Status: SHIPPED | OUTPATIENT
Start: 2023-04-20

## 2023-04-20 NOTE — PROGRESS NOTES
"Subjective   Lita Varma is a 42 y.o. female  Restless Legs Syndrome (Req refill on Gabapentin for RLS), Sinus Problem (Sinus pressure and pain with PND x1 week, at home covid test is neg/), Cough (Dark yellow mucus when coughing x1 week ), and Migraine (Refill amitriptyline)      History of Present Illness  Subjective  Lita Varma is a 42 y.o. female seen today for Restless Legs Syndrome (Req refill on Gabapentin for RLS), Sinus Problem (Sinus pressure and pain with PND x1 week, at home covid test is neg/), Cough (Dark yellow mucus when coughing x1 week ), and Migraine (Refill amitriptyline).     [unfilled]     The following portions of the patient's history were reviewed and updated as appropriate: allergies, current medications, past social history and problem list.    [unfilled]    Objective  /66   Pulse 79   Temp 97.5 °F (36.4 °C)   Ht 180.3 cm (71\")   SpO2 98%   BMI 41.84 kg/m²   [unfilled]    Assessment & Plan    Bronchitis  I will prescribe the patient an antibiotic and cough medicine.  I will also prescribe the patient an inhaler.    Migraines  She will continue on current regimen.    Problems Addressed this Visit    None  Visit Diagnoses     Restless leg syndrome        Relevant Medications    gabapentin (NEURONTIN) 300 MG capsule      Diagnoses       Codes Comments    Restless leg syndrome     ICD-10-CM: G25.81  ICD-9-CM: 333.94         Transcribed from ambient dictation for Lana Sotelo PA-C by Chiquita Junior.  04/20/23   13:58 EDT    {DELIA Provider Statement:29181::\"Patient or patient representative verbalized consent to the visit recording.\",\"I have personally performed the services described in this document as transcribed by the above individual, and it is both accurate and complete.\"}      Sinus Problem  Associated symptoms include congestion, coughing, ear pain, headaches ( STABLE ON MEDS) and sinus pressure. Pertinent negatives include no chills, shortness of breath or sore " throat.   Cough  Associated symptoms include ear pain, headaches ( STABLE ON MEDS), postnasal drip and rhinorrhea. Pertinent negatives include no chills, fever, sore throat or shortness of breath.   Migraine      The following portions of the patient's history were reviewed and updated as appropriate: allergies, current medications, past social history and problem list    Review of Systems   Constitutional: Negative for chills, fatigue and fever.   HENT: Positive for congestion, ear pain, postnasal drip, rhinorrhea and sinus pressure. Negative for sore throat.    Eyes: Negative for pain.   Respiratory: Positive for cough. Negative for shortness of breath.    Neurological: Positive for headaches ( STABLE ON MEDS). Negative for dizziness.   Hematological: Negative for adenopathy.   Psychiatric/Behavioral: Positive for sleep disturbance (STABLE ON MEDS).       Objective     Vitals:    04/20/23 1053   BP: 124/66   Pulse: 79   Temp: 97.5 °F (36.4 °C)   SpO2: 98%       Physical Exam  Vitals and nursing note reviewed.   Constitutional:       General: She is not in acute distress.     Appearance: Normal appearance. She is well-developed. She is not ill-appearing, toxic-appearing or diaphoretic.   HENT:      Head: Normocephalic and atraumatic.      Right Ear: Tympanic membrane and ear canal normal.      Left Ear: Tympanic membrane and ear canal normal.      Nose: Mucosal edema and congestion present. No rhinorrhea.      Right Sinus: Maxillary sinus tenderness and frontal sinus tenderness present.      Left Sinus: Maxillary sinus tenderness and frontal sinus tenderness present.      Mouth/Throat:      Pharynx: No oropharyngeal exudate.   Eyes:      Pupils: Pupils are equal, round, and reactive to light.   Cardiovascular:      Rate and Rhythm: Normal rate and regular rhythm.   Pulmonary:      Effort: Pulmonary effort is normal.      Breath sounds: Normal breath sounds.   Neurological:      Mental Status: She is alert.          Assessment & Plan     Diagnoses and all orders for this visit:    1. Restless leg syndrome  -     gabapentin (NEURONTIN) 300 MG capsule; Take 1-2 nightly at bedtime for restless leg syndrome  Dispense: 180 capsule; Refill: 1    Other orders  -     azithromycin (Zithromax Z-Sarath) 250 MG tablet; Take 2 tablets by mouth on day 1, then 1 tablet daily on days 2-5  Dispense: 6 tablet; Refill: 0  -     albuterol sulfate  (90 Base) MCG/ACT inhaler; Inhale 2 puffs Every 4 (Four) Hours As Needed for Wheezing or Shortness of Air.  Dispense: 6.7 g; Refill: 1  -     benzonatate (TESSALON) 200 MG capsule; Take 1 capsule by mouth 3 (Three) Times a Day As Needed for Cough.  Dispense: 21 capsule; Refill: 1  -     eletriptan (Relpax) 40 MG tablet; Take 1 tablet by mouth 1 (One) Time As Needed for Migraine for up to 1 dose. may repeat in 2 hours if necessary  Dispense: 10 tablet; Refill: 11         As part of this patient's treatment plan, patient will be prescribed controlled substances. The patient has been made aware of appropriate use of such medications, including potential risk of somnolence, limited ability to drive and /or work safely, and potential for dependence or overdose. It has also been made clear that these medications are for use by this patient only, without concomitant use of alcohol or other substances unless prescribed.Controlled substance status of medication discussed with patient, discussed risks of medication including abuse potential and diversion potential and need to follow up for reevaluation appointment in order to receive further refills.    Part of this note may be an electronic transcription/translation of spoken language to printed text using the Dragon Dictation System.    Controlled substance agreement updated and signed today urine drug screen obtained follow-up in 6 months and as needed

## 2023-04-20 NOTE — PROGRESS NOTES
Subjective   Restless Legs Syndrome (Req refill on Gabapentin for RLS), Sinus Problem (Sinus pressure and pain with PND x1 week, at home covid test is neg/), Cough (Dark yellow mucus when coughing x1 week ), and Migraine (Refill amitriptyline).     History of Present Illness     The patient,  1980, presents today with multiple issues, following up on restless leg syndrome, currently on gabapentin, due for refills, having some sinus issues and also follow up on migraines. The patient has refused to weigh.    The patient was feeling bad with a sore throat and stuffy nose. She  walked a half marathon on Saturday, 04/15/2022, and was really sick. The patient feels chest congestion but it is not in her throat anymore. She can breathe through her nose sometimes. The patient is taking Mucinex, NyQuil at night because it helps her sleep better, and allergy pills. She denies any itching in her eyes. The patient is coughing up dark yellowish brown phlegm. She had COVID-19 in 2023. The patient has a history of asthma. She does not have an inhaler. The patient had really bad asthma when [place] lived [place] a farm house. She does not have it nearly as bad now.    The patient reports that she does fairly well with migraines if she takes her amitriptyline. She does not need Relpax that often. The patient loves ibuprofen so much because if she catches a migraine early enough  it relieves the pain. She is not taking that daily.    The following portions of the patient's history were reviewed and updated as appropriate: allergies, current medications, past social history and problem list.    Review of Systems   Constitutional: Negative for chills, fatigue and fever.   Respiratory: Positive for cough, chest tightness and wheezing. Negative for shortness of breath.    Cardiovascular: Negative for chest pain.   Gastrointestinal: Negative for nausea.   Neurological: Positive for tremors ( stable on medication) and headaches ( stable  "on medication).       Objective   /66   Pulse 79   Temp 97.5 °F (36.4 °C)   Ht 180.3 cm (71\")   SpO2 98%   BMI 41.84 kg/m²   Physical Exam  Vitals and nursing note reviewed.   Constitutional:       General: She is not in acute distress.     Appearance: Normal appearance. She is well-developed. She is not ill-appearing, toxic-appearing or diaphoretic.   HENT:      Head: Normocephalic and atraumatic.      Nose: Nose normal.   Neck:      Vascular: No JVD.   Cardiovascular:      Rate and Rhythm: Normal rate and regular rhythm.      Heart sounds: Normal heart sounds. No murmur heard.  Pulmonary:      Effort: Pulmonary effort is normal. No respiratory distress.      Breath sounds: No stridor. Wheezing present.   Musculoskeletal:      Cervical back: Neck supple.   Lymphadenopathy:      Cervical: No cervical adenopathy.   Neurological:      Mental Status: She is alert and oriented to person, place, and time.      Cranial Nerves: No cranial nerve deficit.      Sensory: No sensory deficit.      Coordination: Coordination normal.   Psychiatric:         Mood and Affect: Mood normal.         Behavior: Behavior normal.         Thought Content: Thought content normal.         Judgment: Judgment normal.         Assessment & Plan      1. Bronchitis  - I will prescribe the patient an antibiotic and cough medicine.  - I will also prescribe the patient an inhaler.    2. Migraines  - She will continue on current regimen.      Problems Addressed this Visit    None  Visit Diagnoses     Restless leg syndrome    -  Primary    Relevant Medications    gabapentin (NEURONTIN) 300 MG capsule    Bronchitis        Relevant Medications    albuterol sulfate  (90 Base) MCG/ACT inhaler    benzonatate (TESSALON) 200 MG capsule      Diagnoses       Codes Comments    Restless leg syndrome    -  Primary ICD-10-CM: G25.81  ICD-9-CM: 333.94     Bronchitis     ICD-10-CM: J40  ICD-9-CM: 490              Controlled substance agreement updated " urine drug screen obtained today follow-up in 6 months for recheck    As part of this patient's treatment plan, patient will be prescribed controlled substances. The patient has been made aware of appropriate use of such medications, including potential risk of somnolence, limited ability to drive and /or work safely, and potential for dependence or overdose. It has also been made clear that these medications are for use by this patient only, without concomitant use of alcohol or other substances unless prescribed.Controlled substance status of medication discussed with patient, discussed risks of medication including abuse potential and diversion potential and need to follow up for reevaluation appointment in order to receive further refills.    Part of this note may be an electronic transcription/translation of spoken language to printed text using the Dragon Dictation System.        Transcribed from ambient dictation for Lana Sotelo PA-C by Roisbel Self.  04/20/23   18:58 EDT    Patient or patient representative verbalized consent to the visit recording.  I have personally performed the services described in this document as transcribed by the above individual, and it is both accurate and complete.  Lana Sotelo PA-C  4/21/2023  12:53 EDT

## 2023-04-27 LAB — DRUGS UR: NORMAL

## 2023-05-25 RX ORDER — NITROFURANTOIN 25; 75 MG/1; MG/1
100 CAPSULE ORAL 2 TIMES DAILY
Qty: 14 CAPSULE | Refills: 0 | Status: SHIPPED | OUTPATIENT
Start: 2023-05-25

## 2024-04-11 DIAGNOSIS — G43.009 MIGRAINE WITHOUT AURA AND WITHOUT STATUS MIGRAINOSUS, NOT INTRACTABLE: ICD-10-CM

## 2024-04-11 RX ORDER — AMITRIPTYLINE HYDROCHLORIDE 25 MG/1
TABLET, FILM COATED ORAL
Qty: 90 TABLET | Refills: 0 | Status: SHIPPED | OUTPATIENT
Start: 2024-04-11

## 2024-07-17 ENCOUNTER — OFFICE VISIT (OUTPATIENT)
Dept: FAMILY MEDICINE CLINIC | Facility: CLINIC | Age: 44
End: 2024-07-17
Payer: COMMERCIAL

## 2024-07-17 VITALS
DIASTOLIC BLOOD PRESSURE: 98 MMHG | HEART RATE: 78 BPM | HEIGHT: 71 IN | BODY MASS INDEX: 41.84 KG/M2 | TEMPERATURE: 98.4 F | OXYGEN SATURATION: 99 % | SYSTOLIC BLOOD PRESSURE: 140 MMHG

## 2024-07-17 DIAGNOSIS — Z51.81 ENCOUNTER FOR THERAPEUTIC DRUG MONITORING: ICD-10-CM

## 2024-07-17 DIAGNOSIS — G25.81 RESTLESS LEG SYNDROME: ICD-10-CM

## 2024-07-17 DIAGNOSIS — G43.009 MIGRAINE WITHOUT AURA AND WITHOUT STATUS MIGRAINOSUS, NOT INTRACTABLE: Primary | ICD-10-CM

## 2024-07-17 PROCEDURE — 99214 OFFICE O/P EST MOD 30 MIN: CPT | Performed by: PHYSICIAN ASSISTANT

## 2024-07-17 RX ORDER — GABAPENTIN 300 MG/1
CAPSULE ORAL
Qty: 180 CAPSULE | Refills: 1 | Status: SHIPPED | OUTPATIENT
Start: 2024-07-17

## 2024-07-17 RX ORDER — ELETRIPTAN HYDROBROMIDE 40 MG/1
40 TABLET, FILM COATED ORAL ONCE AS NEEDED
Qty: 10 TABLET | Refills: 11 | Status: SHIPPED | OUTPATIENT
Start: 2024-07-17

## 2024-07-17 RX ORDER — AMITRIPTYLINE HYDROCHLORIDE 25 MG/1
25 TABLET, FILM COATED ORAL NIGHTLY
Qty: 90 TABLET | Refills: 3 | Status: SHIPPED | OUTPATIENT
Start: 2024-07-17

## 2024-07-17 NOTE — PROGRESS NOTES
Subjective   Lita Varma is a 43 y.o. female  migraine (Refill on amitriptyline and relpax ), Restless Legs Syndrome (Refill on gabapentin), and Paperwork  (Needs paperwork completed for [facility] for [facility] ([place], [place] Services)      History of Present Illness  History of Present Illness  The patient is coming in today for follow-up on chronic medical conditions, medication refills, and needing some paperwork filled out as well.    She is seeking certification in respite care, which requires both her and her  to be medically cleared.  This form is primarily to ensure she does not have any health conditions that could make her unsafe to be around others.  Her medications are effective, but she experiences difficulty waking up in the morning and moving, which she attributes to the amitriptyline. If she misses a dose, she experiences migraines. She has previously tried Topamax for migraines, but experienced side effects, including increased water intake. She is considering taking half a pill of Topamax. She is nearing the end of her IUD implant and is unsure if she should visit an OB/GYN for IUD removal or a new one placement. She has an inhaler, which she does not use frequently. She is considering weight loss injections and is trying to get back on track and return to the gym.    Gabapentin is effectively managing her restless legs syndrome without any adverse effects noted.    The following portions of the patient's history were reviewed and updated as appropriate: allergies, current medications, past social history and problem list    Review of Systems   Constitutional:  Negative for fatigue and unexpected weight change.   HENT:  Negative for congestion, dental problem, postnasal drip, sinus pressure and sore throat.    Eyes:  Negative for photophobia, pain and visual disturbance.   Gastrointestinal:  Negative for nausea and vomiting.   Neurological:  Positive for tremors (RLS stable on medication) and  headaches (stable on medication). Negative for dizziness, syncope, facial asymmetry, speech difficulty, weakness, light-headedness and numbness.   Psychiatric/Behavioral:  Negative for agitation, confusion, dysphoric mood and sleep disturbance. The patient is not nervous/anxious.        Objective     Vitals:    07/17/24 0916   BP: 140/98   Pulse: 78   Temp: 98.4 °F (36.9 °C)   SpO2: 99%       Physical Exam  Vitals and nursing note reviewed.   Constitutional:       General: She is not in acute distress.     Appearance: Normal appearance. She is well-developed. She is not ill-appearing, toxic-appearing or diaphoretic.   Eyes:      Conjunctiva/sclera: Conjunctivae normal.   Cardiovascular:      Rate and Rhythm: Normal rate and regular rhythm.   Pulmonary:      Effort: Pulmonary effort is normal.      Breath sounds: Normal breath sounds.   Musculoskeletal:         General: Normal range of motion.   Neurological:      Mental Status: She is alert and oriented to person, place, and time.      Sensory: No sensory deficit.      Motor: No weakness.      Coordination: Coordination normal.      Gait: Gait normal.   Psychiatric:         Attention and Perception: She is attentive.         Mood and Affect: Mood normal.         Behavior: Behavior normal.         Thought Content: Thought content normal.         Judgment: Judgment normal.       Physical Exam      Assessment & Plan   Assessment & Plan  1. Certification in respite care.  Forms will be completed.    2. Migraines,  Amitriptyline will be prescribed for daily use, with instructions to adjust the dosage as needed to manage migraines. Topamax will be trialed, starting with half a tablet every other night.    3. Restless leg syndrome.  Gabapentin will be refilled.    Controlled substance agreement updated urine drug screen ordered she will get this through [employer].  Patient will follow-up for recheck in 6 months.    As part of this patient's treatment plan, patient will be  prescribed controlled substances. The patient has been made aware of appropriate use of such medications, including potential risk of somnolence, limited ability to drive and /or work safely, and potential for dependence or overdose. It has also been made clear that these medications are for use by this patient only, without concomitant use of alcohol or other substances unless prescribed.Controlled substance status of medication discussed with patient, discussed risks of medication including abuse potential and diversion potential and need to follow up for reevaluation appointment in order to receive further refills.    Part of this note may be an electronic transcription/translation of spoken language to printed text using the Dragon Dictation System.      4. Medication refills.  Relpax will be refilled for 1 year.    Diagnoses and all orders for this visit:    1. Migraine without aura and without status migrainosus, not intractable (Primary)  -     amitriptyline (ELAVIL) 25 MG tablet; Take 1 tablet by mouth Every Night.  Dispense: 90 tablet; Refill: 3    2. Restless leg syndrome  -     gabapentin (NEURONTIN) 300 MG capsule; Take 1-2 nightly at bedtime for restless leg syndrome  Dispense: 180 capsule; Refill: 1    3. Encounter for therapeutic drug monitoring  -     Urine Drug Screen - Urine, Clean Catch; Future    Other orders  -     eletriptan (Relpax) 40 MG tablet; Take 1 tablet by mouth 1 (One) Time As Needed for Migraine for up to 120 doses. may repeat in 2 hours if necessary  Dispense: 10 tablet; Refill: 11         Patient or patient representative verbalized consent for the use of Ambient Listening during the visit with  Lana Sotelo PA-C for chart documentation. 7/17/2024  12:29 EDT

## 2025-01-11 DIAGNOSIS — G25.81 RESTLESS LEG SYNDROME: ICD-10-CM

## 2025-01-13 RX ORDER — GABAPENTIN 300 MG/1
CAPSULE ORAL
Qty: 180 CAPSULE | Refills: 0 | OUTPATIENT
Start: 2025-01-13

## 2025-07-07 DIAGNOSIS — G43.009 MIGRAINE WITHOUT AURA AND WITHOUT STATUS MIGRAINOSUS, NOT INTRACTABLE: ICD-10-CM

## 2025-08-06 ENCOUNTER — OFFICE VISIT (OUTPATIENT)
Dept: FAMILY MEDICINE CLINIC | Facility: CLINIC | Age: 45
End: 2025-08-06
Payer: COMMERCIAL

## 2025-08-06 VITALS
OXYGEN SATURATION: 97 % | HEIGHT: 71 IN | TEMPERATURE: 98.6 F | SYSTOLIC BLOOD PRESSURE: 128 MMHG | HEART RATE: 84 BPM | DIASTOLIC BLOOD PRESSURE: 78 MMHG | BODY MASS INDEX: 41.84 KG/M2

## 2025-08-06 DIAGNOSIS — Z51.81 ENCOUNTER FOR THERAPEUTIC DRUG MONITORING: ICD-10-CM

## 2025-08-06 DIAGNOSIS — G43.009 MIGRAINE WITHOUT AURA AND WITHOUT STATUS MIGRAINOSUS, NOT INTRACTABLE: Primary | ICD-10-CM

## 2025-08-06 DIAGNOSIS — F41.8 SITUATIONAL ANXIETY: ICD-10-CM

## 2025-08-06 DIAGNOSIS — G25.81 RESTLESS LEG SYNDROME: ICD-10-CM

## 2025-08-06 RX ORDER — CLONAZEPAM 0.5 MG/1
0.5 TABLET ORAL DAILY PRN
Qty: 30 TABLET | Refills: 0 | Status: SHIPPED | OUTPATIENT
Start: 2025-08-06

## 2025-08-06 RX ORDER — ZOLMITRIPTAN 5 MG/1
5 TABLET, FILM COATED ORAL ONCE AS NEEDED
Qty: 9 TABLET | Refills: 11 | Status: SHIPPED | OUTPATIENT
Start: 2025-08-06 | End: 2025-09-05

## 2025-08-06 RX ORDER — GABAPENTIN 300 MG/1
CAPSULE ORAL
Qty: 180 CAPSULE | Refills: 1 | Status: SHIPPED | OUTPATIENT
Start: 2025-08-06